# Patient Record
Sex: MALE | Race: WHITE | Employment: FULL TIME | URBAN - METROPOLITAN AREA
[De-identification: names, ages, dates, MRNs, and addresses within clinical notes are randomized per-mention and may not be internally consistent; named-entity substitution may affect disease eponyms.]

---

## 2018-02-26 ENCOUNTER — HOSPITAL ENCOUNTER (EMERGENCY)
Age: 63
Discharge: HOME | End: 2018-02-26
Attending: EMERGENCY MEDICINE
Payer: COMMERCIAL

## 2018-02-26 VITALS
HEIGHT: 74 IN | SYSTOLIC BLOOD PRESSURE: 126 MMHG | BODY MASS INDEX: 28.23 KG/M2 | HEART RATE: 89 BPM | WEIGHT: 220 LBS | DIASTOLIC BLOOD PRESSURE: 76 MMHG | TEMPERATURE: 97.3 F | RESPIRATION RATE: 14 BRPM | OXYGEN SATURATION: 95 %

## 2018-02-26 DIAGNOSIS — M54.41 ACUTE RIGHT-SIDED LOW BACK PAIN WITH RIGHT-SIDED SCIATICA: Primary | ICD-10-CM

## 2018-02-26 PROCEDURE — 74011250637 HC RX REV CODE- 250/637: Performed by: EMERGENCY MEDICINE

## 2018-02-26 PROCEDURE — 99283 EMERGENCY DEPT VISIT LOW MDM: CPT

## 2018-02-26 PROCEDURE — 96372 THER/PROPH/DIAG INJ SC/IM: CPT

## 2018-02-26 PROCEDURE — 74011250636 HC RX REV CODE- 250/636: Performed by: EMERGENCY MEDICINE

## 2018-02-26 RX ORDER — DEXAMETHASONE SODIUM PHOSPHATE 100 MG/10ML
10 INJECTION INTRAMUSCULAR; INTRAVENOUS
Status: DISCONTINUED | OUTPATIENT
Start: 2018-02-26 | End: 2018-02-26

## 2018-02-26 RX ORDER — KETOROLAC TROMETHAMINE 30 MG/ML
60 INJECTION, SOLUTION INTRAMUSCULAR; INTRAVENOUS
Status: COMPLETED | OUTPATIENT
Start: 2018-02-26 | End: 2018-02-26

## 2018-02-26 RX ORDER — KETOROLAC TROMETHAMINE 30 MG/ML
INJECTION, SOLUTION INTRAMUSCULAR; INTRAVENOUS
Status: DISCONTINUED
Start: 2018-02-26 | End: 2018-02-27 | Stop reason: HOSPADM

## 2018-02-26 RX ORDER — DIAZEPAM 5 MG/1
5 TABLET ORAL
Qty: 10 TAB | Refills: 0 | Status: SHIPPED | OUTPATIENT
Start: 2018-02-26 | End: 2018-03-13

## 2018-02-26 RX ORDER — KETOROLAC TROMETHAMINE 30 MG/ML
30 INJECTION, SOLUTION INTRAMUSCULAR; INTRAVENOUS
Status: DISCONTINUED | OUTPATIENT
Start: 2018-02-26 | End: 2018-02-26

## 2018-02-26 RX ORDER — DIAZEPAM 5 MG/1
5 TABLET ORAL
Status: DISCONTINUED | OUTPATIENT
Start: 2018-02-26 | End: 2018-02-26

## 2018-02-26 RX ORDER — METHYLPREDNISOLONE 4 MG/1
TABLET ORAL
Qty: 1 DOSE PACK | Refills: 0 | Status: SHIPPED | OUTPATIENT
Start: 2018-02-26 | End: 2018-03-09

## 2018-02-26 RX ORDER — DIAZEPAM 10 MG/2ML
5 INJECTION INTRAMUSCULAR
Status: DISCONTINUED | OUTPATIENT
Start: 2018-02-26 | End: 2018-02-26

## 2018-02-26 RX ORDER — HYDROMORPHONE HYDROCHLORIDE 2 MG/ML
1 INJECTION, SOLUTION INTRAMUSCULAR; INTRAVENOUS; SUBCUTANEOUS
Status: COMPLETED | OUTPATIENT
Start: 2018-02-26 | End: 2018-02-26

## 2018-02-26 RX ORDER — DIAZEPAM 5 MG/1
5 TABLET ORAL
Status: COMPLETED | OUTPATIENT
Start: 2018-02-26 | End: 2018-02-26

## 2018-02-26 RX ADMIN — HYDROMORPHONE HYDROCHLORIDE 1 MG: 2 INJECTION INTRAMUSCULAR; INTRAVENOUS; SUBCUTANEOUS at 22:41

## 2018-02-26 RX ADMIN — KETOROLAC TROMETHAMINE 60 MG: 30 INJECTION, SOLUTION INTRAMUSCULAR at 21:35

## 2018-02-26 RX ADMIN — DIAZEPAM 5 MG: 5 TABLET ORAL at 21:35

## 2018-02-27 NOTE — ED PROVIDER NOTES
HPI Comments: 58 y.o. male with past medical history significant for hypercholesterolemia who presents from home via private vehicle with chief complaint of lower back pain. Pt reports that he had onset of lower back pain a little over a month ago and was seen at [facility] and referred to Dr. aJyesh Gupta, orthopedics. 3 weeks ago, pt received an injection and notes that he had started to have some improvement of the pain. He had the second injection 4 days ago, but notes onset 3 days ago of a different pain that has progressively worsened. Pt notes that this pain, while also located in his lower back, is accompanied by numbness that is \"creeping up\" his RLE, currently present in his foot and calf. He states that the back pain radiated down his right leg prior to the second injection, but afterwards, he claims to now have a \"electric shock\" pain that \"shoots up\" from his foot to his right hip every time he tries to bear weight on the RLE. Pt was on oral steroids 3 weeks ago. Pt was prescribed percocet 1/22/18 and has been trying to stretch it, recently running out. He has also been taking 300 mg Flexeril BID since the same date. Pt denies fever, CP, SOB, and having any incontinence of bowels or bladder. Pt has not yet had an MRI of his back. Pt and spouse tried to call but were unable to get in contact with pt's MD today. Pt denies fever, CP, and SOB. There are no other acute medical concerns at this time. Orthopedist: Mary Lou Parker MD     Note written by Cholo Toro, as dictated by Paz Durand MD 8:18 PM     The history is provided by the patient and the spouse. No  was used. No past medical history on file. No past surgical history on file. No family history on file. Social History     Social History    Marital status:      Spouse name: N/A    Number of children: N/A    Years of education: N/A     Occupational History    Not on file.      Social History Main Topics    Smoking status: Not on file    Smokeless tobacco: Not on file    Alcohol use Not on file    Drug use: Not on file    Sexual activity: Not on file     Other Topics Concern    Not on file     Social History Narrative         ALLERGIES: Review of patient's allergies indicates not on file. Review of Systems   Constitutional: Negative for fever. Respiratory: Negative for shortness of breath. Cardiovascular: Negative for chest pain. Genitourinary: Negative for difficulty urinating. Musculoskeletal: Positive for back pain. +RLE pain   Neurological: Positive for numbness (RLE). No bowel or bladder inconinence   All other systems reviewed and are negative.       Vitals:    02/26/18 1910   BP: (!) 147/105   Pulse: 90   Resp: 20   Temp: 97.3 °F (36.3 °C)   SpO2: 99%   Weight: 99.8 kg (220 lb)   Height: 6' 2\" (1.88 m)            Physical Exam   Physical Examination: General appearance - alert, mild distress, oriented to person, place, and time and normal appearing weight  Eyes - pupils equal and reactive, extraocular eye movements intact  Neck - supple, no significant adenopathy  Chest - clear to auscultation, no wheezes, rales or rhonchi, symmetric air entry  Heart - normal rate, regular rhythm, normal S1, S2, no murmurs, rubs, clicks or gallops  Abdomen - soft, nontender, nondistended, no masses or organomegaly  Back exam - limited rom due to pain, tenderness to right paraspinal muscle region in lumbar spine, normal b/l LE reflexes  Neurological - alert, oriented, normal speech, no focal findings or movement disorder noted  Musculoskeletal - no joint tenderness, deformity or swelling  Extremities - peripheral pulses normal, no pedal edema, no clubbing or cyanosis  Skin - normal coloration and turgor, no rashes, no suspicious skin lesions noted  MDM  Number of Diagnoses or Management Options  Acute right-sided low back pain with right-sided sciatica:      Amount and/or Complexity of Data Reviewed  Obtain history from someone other than the patient: yes (wife)    Patient Progress  Patient progress: improved        ED Course       Procedures    Pain improved with meds in ED. Has f/u appt with ortho spine Dr. Michael Grande on Wednesday. No bowel/bladder incontinence or saddle anesthesia. Return to ED for worsening symptoms.

## 2018-02-27 NOTE — ED TRIAGE NOTES
Patient with back pain that radiates down the right leg, patient of Dr Germán Feliz, was not able to get [place] Phenes of MD

## 2018-02-27 NOTE — DISCHARGE INSTRUCTIONS
Back Pain: Care Instructions  Your Care Instructions    Back pain has many possible causes. It is often related to problems with muscles and ligaments of the back. It may also be related to problems with the nerves, discs, or bones of the back. Moving, lifting, standing, sitting, or sleeping in an awkward way can strain the back. Sometimes you don't notice the injury until later. Arthritis is another common cause of back pain. Although it may hurt a lot, back pain usually improves on its own within several weeks. Most people recover in 12 weeks or less. Using good home treatment and being careful not to stress your back can help you feel better sooner. Follow-up care is a key part of your treatment and safety. Be sure to make and go to all appointments, and call your doctor if you are having problems. It's also a good idea to know your test results and keep a list of the medicines you take. How can you care for yourself at home? · Sit or lie in positions that are most comfortable and reduce your pain. Try one of these positions when you lie down:  ¨ Lie on your back with your knees bent and supported by large pillows. ¨ Lie on the floor with your legs on the seat of a sofa or chair. Edgar Harries on your side with your knees and hips bent and a pillow between your legs. ¨ Lie on your stomach if it does not make pain worse. · Do not sit up in bed, and avoid soft couches and twisted positions. Bed rest can help relieve pain at first, but it delays healing. Avoid bed rest after the first day of back pain. · Change positions every 30 minutes. If you must sit for long periods of time, take breaks from sitting. Get up and walk around, or lie in a comfortable position. · Try using a heating pad on a low or medium setting for 15 to 20 minutes every 2 or 3 hours. Try a warm shower in place of one session with the heating pad. · You can also try an ice pack for 10 to 15 minutes every 2 to 3 hours.  Put a thin cloth between the ice pack and your skin. · Take pain medicines exactly as directed. ¨ If the doctor gave you a prescription medicine for pain, take it as prescribed. ¨ If you are not taking a prescription pain medicine, ask your doctor if you can take an over-the-counter medicine. · Take short walks several times a day. You can start with 5 to 10 minutes, 3 or 4 times a day, and work up to longer walks. Walk on level surfaces and avoid hills and stairs until your back is better. · Return to work and other activities as soon as you can. Continued rest without activity is usually not good for your back. · To prevent future back pain, do exercises to stretch and strengthen your back and stomach. Learn how to use good posture, safe lifting techniques, and proper body mechanics. When should you call for help? Call your doctor now or seek immediate medical care if:  ? · You have new or worsening numbness in your legs. ? · You have new or worsening weakness in your legs. (This could make it hard to stand up.)   ? · You lose control of your bladder or bowels. ? Watch closely for changes in your health, and be sure to contact your doctor if:  ? · Your pain gets worse. ? · You are not getting better after 2 weeks. Where can you learn more? Go to http://amy-manuel.info/. Enter V594 in the search box to learn more about \"Back Pain: Care Instructions. \"  Current as of: March 21, 2017  Content Version: 11.4  © 3780-0447 [employer]. Care instructions adapted under license by [employer] (which disclaims liability or warranty for this information). If you have questions about a medical condition or this instruction, always ask your healthcare professional. [address] any warranty or liability for your use of this information. Back Stretches: Exercises  Your Care Instructions  Here are some examples of exercises for stretching your back.  Start each exercise slowly. Ease off the exercise if you start to have pain. Your doctor or physical therapist will tell you when you can start these exercises and which ones will work best for you. How to do the exercises  Overhead stretch    1. Stand comfortably with your feet shoulder-width apart. 2. Looking straight ahead, raise both arms over your head and reach toward the ceiling. Do not allow your head to tilt back. 3. Hold for 15 to 30 seconds, then lower your arms to your sides. 4. Repeat 2 to 4 times. Side stretch    1. Stand comfortably with your feet shoulder-width apart. 2. Raise one arm over your head, and then lean to the other side. 3. Slide your hand down your leg as you let the weight of your arm gently stretch your side muscles. Hold for 15 to 30 seconds. 4. Repeat 2 to 4 times on each side. Press-up    1. Lie on your stomach, supporting your body with your forearms. 2. Press your elbows down into the floor to raise your upper back. As you do this, relax your stomach muscles and allow your back to arch without using your back muscles. As your press up, do not let your hips or pelvis come off the floor. 3. Hold for 15 to 30 seconds, then relax. 4. Repeat 2 to 4 times. Relax and rest    1. Lie on your back with a rolled towel under your neck and a pillow under your knees. Extend your arms comfortably to your sides. 2. Relax and breathe normally. 3. Remain in this position for about 10 minutes. 4. If you can, do this 2 or 3 times each day. Follow-up care is a key part of your treatment and safety. Be sure to make and go to all appointments, and call your doctor if you are having problems. It's also a good idea to know your test results and keep a list of the medicines you take. Where can you learn more? Go to http://amy-manuel.info/. Enter X146 in the search box to learn more about \"Back Stretches: Exercises. \"  Current as of: March 21, 2017  Content Version: 11.4  © 8471-7378 [employer]. Care instructions adapted under license by [employer] (which disclaims liability or warranty for this information). If you have questions about a medical condition or this instruction, always ask your healthcare professional. [address] any warranty or liability for your use of this information. We hope that we have addressed all of your medical concerns. The examination and treatment you received in the Emergency Department were for an emergent problem and were not intended as complete care. It is important that you follow up with your healthcare provider(s) for ongoing care. If your symptoms worsen or do not improve as expected, and you are unable to reach your usual health care provider(s), you should return to the Emergency Department. Today's healthcare is undergoing tremendous change, and patient satisfaction surveys are one of the many tools to assess the quality of medical care. You may receive a survey from the [employer] regarding your experience in the Emergency Department. I hope that your experience has been completely positive, particularly the medical care that I provided. As such, please participate in the survey; anything less than excellent does not meet my expectations or intentions. [facility] and [facility] participate in nationally recognized quality of care measures. If your blood pressure is greater than 120/80, as reported below, we urge that you seek medical care to address the potential of high blood pressure, commonly known as hypertension. Hypertension can be hereditary or can be caused by certain medical conditions, pain, stress, or \"white coat syndrome. \"       Please make an appointment with your health care provider(s) for follow up of your Emergency Department visit.        VITALS:   Patient Vitals for the past 8 hrs:   Temp Pulse Resp BP SpO2   02/26/18 1910 97.3 °F (36.3 °C) 90 20 (!) 147/105 99 %          Thank you for allowing us to provide you with medical care today. We realize that you have many choices for your emergency care needs. Please choose us in the future for any continued health care needs. Esteban Garcia, [address]: [phone]            No results found for this or any previous visit (from the past 24 hour(s)). No results found.

## 2018-02-28 ENCOUNTER — HOSPITAL ENCOUNTER (OUTPATIENT)
Dept: MRI IMAGING | Age: 63
Discharge: HOME | End: 2018-02-28
Attending: ORTHOPAEDIC SURGERY
Payer: COMMERCIAL

## 2018-02-28 DIAGNOSIS — M54.16 LUMBAR RADICULOPATHY: ICD-10-CM

## 2018-02-28 DIAGNOSIS — M43.16 SPONDYLOLISTHESIS OF LUMBAR REGION: ICD-10-CM

## 2018-02-28 PROCEDURE — 72148 MRI LUMBAR SPINE W/O DYE: CPT

## 2018-03-09 RX ORDER — ALLOPURINOL 100 MG/1
200 TABLET ORAL
COMMUNITY

## 2018-03-09 RX ORDER — GABAPENTIN 300 MG/1
300 CAPSULE ORAL 2 TIMES DAILY
COMMUNITY

## 2018-03-09 RX ORDER — OXYCODONE AND ACETAMINOPHEN 5; 325 MG/1; MG/1
1 TABLET ORAL
COMMUNITY
End: 2018-03-13

## 2018-03-09 RX ORDER — ATORVASTATIN CALCIUM 20 MG/1
20 TABLET, FILM COATED ORAL
COMMUNITY

## 2018-03-09 NOTE — PERIOP NOTES
Called patient prior to PAT appointment- reviewed medical/surgical/family/ social history. Also reviewed medications and health care providers. Patient asked to bring list of medications or actual medication containers to PAT appointment.

## 2018-03-13 ENCOUNTER — TELEPHONE (OUTPATIENT)
Dept: CARDIOLOGY CLINIC | Age: 63
End: 2018-03-13

## 2018-03-13 ENCOUNTER — HOSPITAL ENCOUNTER (OUTPATIENT)
Dept: PREADMISSION TESTING | Age: 63
Discharge: HOME | End: 2018-03-13
Payer: COMMERCIAL

## 2018-03-13 VITALS
SYSTOLIC BLOOD PRESSURE: 133 MMHG | WEIGHT: 213.85 LBS | OXYGEN SATURATION: 97 % | TEMPERATURE: 97 F | HEART RATE: 116 BPM | HEIGHT: 73 IN | RESPIRATION RATE: 16 BRPM | DIASTOLIC BLOOD PRESSURE: 91 MMHG | BODY MASS INDEX: 28.34 KG/M2

## 2018-03-13 LAB
ABO + RH BLD: NORMAL
ALBUMIN SERPL-MCNC: 4 G/DL (ref 3.5–5)
ALBUMIN/GLOB SERPL: 1.2 {RATIO} (ref 1.1–2.2)
ALP SERPL-CCNC: 82 U/L (ref 45–117)
ALT SERPL-CCNC: 50 U/L (ref 12–78)
ANION GAP SERPL CALC-SCNC: 9 MMOL/L (ref 5–15)
APPEARANCE UR: CLEAR
APTT PPP: 23.5 SEC (ref 22.1–32)
AST SERPL-CCNC: 20 U/L (ref 15–37)
ATRIAL RATE: 94 BPM
BACTERIA URNS QL MICRO: NEGATIVE /HPF
BASOPHILS # BLD: 0.1 K/UL (ref 0–0.1)
BASOPHILS NFR BLD: 1 % (ref 0–1)
BILIRUB SERPL-MCNC: 0.7 MG/DL (ref 0.2–1)
BILIRUB UR QL: NEGATIVE
BLOOD GROUP ANTIBODIES SERPL: NORMAL
BUN SERPL-MCNC: 19 MG/DL (ref 6–20)
BUN/CREAT SERPL: 17 (ref 12–20)
CALCIUM SERPL-MCNC: 11.3 MG/DL (ref 8.5–10.1)
CALCULATED P AXIS, ECG09: 57 DEGREES
CALCULATED R AXIS, ECG10: 47 DEGREES
CALCULATED T AXIS, ECG11: 34 DEGREES
CAOX CRY URNS QL MICRO: ABNORMAL
CHLORIDE SERPL-SCNC: 101 MMOL/L (ref 97–108)
CO2 SERPL-SCNC: 31 MMOL/L (ref 21–32)
COLOR UR: ABNORMAL
CREAT SERPL-MCNC: 1.09 MG/DL (ref 0.7–1.3)
DIAGNOSIS, 93000: NORMAL
DIFFERENTIAL METHOD BLD: ABNORMAL
EOSINOPHIL # BLD: 0.1 K/UL (ref 0–0.4)
EOSINOPHIL NFR BLD: 0 % (ref 0–7)
EPITH CASTS URNS QL MICRO: ABNORMAL /LPF
ERYTHROCYTE [DISTWIDTH] IN BLOOD BY AUTOMATED COUNT: 13.3 % (ref 11.5–14.5)
EST. AVERAGE GLUCOSE BLD GHB EST-MCNC: 160 MG/DL
GLOBULIN SER CALC-MCNC: 3.4 G/DL (ref 2–4)
GLUCOSE SERPL-MCNC: 175 MG/DL (ref 65–100)
GLUCOSE UR STRIP.AUTO-MCNC: NEGATIVE MG/DL
HBA1C MFR BLD: 7.2 % (ref 4.2–6.3)
HCT VFR BLD AUTO: 50.5 % (ref 36.6–50.3)
HGB BLD-MCNC: 16.8 G/DL (ref 12.1–17)
HGB UR QL STRIP: NEGATIVE
HYALINE CASTS URNS QL MICRO: ABNORMAL /LPF (ref 0–5)
IMM GRANULOCYTES # BLD: 0.1 K/UL (ref 0–0.04)
IMM GRANULOCYTES NFR BLD AUTO: 1 % (ref 0–0.5)
INR PPP: 1 (ref 0.9–1.1)
KETONES UR QL STRIP.AUTO: NEGATIVE MG/DL
LEUKOCYTE ESTERASE UR QL STRIP.AUTO: NEGATIVE
LYMPHOCYTES # BLD: 1.5 K/UL (ref 0.8–3.5)
LYMPHOCYTES NFR BLD: 12 % (ref 12–49)
MCH RBC QN AUTO: 28.1 PG (ref 26–34)
MCHC RBC AUTO-ENTMCNC: 33.3 G/DL (ref 30–36.5)
MCV RBC AUTO: 84.6 FL (ref 80–99)
MONOCYTES # BLD: 0.8 K/UL (ref 0–1)
MONOCYTES NFR BLD: 6 % (ref 5–13)
NEUTS SEG # BLD: 10.8 K/UL (ref 1.8–8)
NEUTS SEG NFR BLD: 81 % (ref 32–75)
NITRITE UR QL STRIP.AUTO: NEGATIVE
NRBC # BLD: 0 K/UL (ref 0–0.01)
NRBC BLD-RTO: 0 PER 100 WBC
P-R INTERVAL, ECG05: 190 MS
PH UR STRIP: 5.5 [PH] (ref 5–8)
PLATELET # BLD AUTO: 255 K/UL (ref 150–400)
PMV BLD AUTO: 9.4 FL (ref 8.9–12.9)
POTASSIUM SERPL-SCNC: 4.2 MMOL/L (ref 3.5–5.1)
PROT SERPL-MCNC: 7.4 G/DL (ref 6.4–8.2)
PROT UR STRIP-MCNC: NEGATIVE MG/DL
PROTHROMBIN TIME: 10.4 SEC (ref 9–11.1)
Q-T INTERVAL, ECG07: 298 MS
QRS DURATION, ECG06: 80 MS
QTC CALCULATION (BEZET), ECG08: 372 MS
RBC # BLD AUTO: 5.97 M/UL (ref 4.1–5.7)
RBC #/AREA URNS HPF: ABNORMAL /HPF (ref 0–5)
SODIUM SERPL-SCNC: 141 MMOL/L (ref 136–145)
SP GR UR REFRACTOMETRY: 1.03 (ref 1–1.03)
SPECIMEN EXP DATE BLD: NORMAL
THERAPEUTIC RANGE,PTTT: NORMAL SECS (ref 58–77)
UA: UC IF INDICATED,UAUC: ABNORMAL
UROBILINOGEN UR QL STRIP.AUTO: 1 EU/DL (ref 0.2–1)
VENTRICULAR RATE, ECG03: 94 BPM
WBC # BLD AUTO: 13.4 K/UL (ref 4.1–11.1)
WBC URNS QL MICRO: ABNORMAL /HPF (ref 0–4)

## 2018-03-13 PROCEDURE — 80053 COMPREHEN METABOLIC PANEL: CPT | Performed by: ORTHOPAEDIC SURGERY

## 2018-03-13 PROCEDURE — 86900 BLOOD TYPING SEROLOGIC ABO: CPT | Performed by: ORTHOPAEDIC SURGERY

## 2018-03-13 PROCEDURE — 83036 HEMOGLOBIN GLYCOSYLATED A1C: CPT | Performed by: ORTHOPAEDIC SURGERY

## 2018-03-13 PROCEDURE — 85610 PROTHROMBIN TIME: CPT | Performed by: ORTHOPAEDIC SURGERY

## 2018-03-13 PROCEDURE — 85025 COMPLETE CBC W/AUTO DIFF WBC: CPT | Performed by: ORTHOPAEDIC SURGERY

## 2018-03-13 PROCEDURE — 81001 URINALYSIS AUTO W/SCOPE: CPT | Performed by: ORTHOPAEDIC SURGERY

## 2018-03-13 PROCEDURE — 85730 THROMBOPLASTIN TIME PARTIAL: CPT | Performed by: ORTHOPAEDIC SURGERY

## 2018-03-13 PROCEDURE — 36415 COLL VENOUS BLD VENIPUNCTURE: CPT | Performed by: ORTHOPAEDIC SURGERY

## 2018-03-13 PROCEDURE — 93005 ELECTROCARDIOGRAM TRACING: CPT

## 2018-03-13 RX ORDER — HYDROCODONE BITARTRATE AND ACETAMINOPHEN 5; 325 MG/1; MG/1
1 TABLET ORAL
Status: ON HOLD | COMMUNITY
End: 2018-03-20

## 2018-03-13 NOTE — PERIOP NOTES
[facility], [address]    MAIN OR [phone]    MAIN PRE OP [phone]    AMBULATORY PRE OP [phone]    PRE-ADMISSION TESTING [phone]       Surgery Date:   Tuesday 3/20/18        Is surgery arrival time given by surgeon? NO  If NO, [facility] staff will call you between 3 and 7pm the day before your surgery with your arrival time. (If your surgery is on a Monday, we will call you the Friday before.)    Call [phone] after 7pm Monday-Friday if you did not receive your arrival time.     Answers to Common Questions   When You  Arrive   Arrive at the [facility] [address] [facility] on the day of your surgery  Have your insurance card, photo ID, and any copayment (if needed)     Food   and   Drink   NO food or drink after midnight the night before surgery    This means NO water, gum, mints, coffee, juice, etc.  No alcohol (beer, wine, liquor) 24 hours before and after surgery     Medicine to   TAKE   Morning of Surgery   MEDICATIONS TO TAKE THE MORNING OF SURGERY WITH A SIP OF WATER:    Gabapentin, norco     Medicine  To  STOP   FOR PAIN   NO Aspirin for pain    NO Non-Steroidal Anti-Inflammatory Drugs (NSAIDs:   for example, Ibuprofen (Advil, Motrin), Naproxen (Aleve)   STOP herbal supplements and vitamins 1 week before surgery   You can take Tylenol  follow instructions on the bottle     Blood  Thinners    If you take Aspirin, Plavix, Coumadin, blood-thinning or anti-clot medicine, talk to your surgeon and/or follow the instructions from the doctor who told you to take that medicine     Clothing  Jewelry  Valuables  Bathing     CLOTHING   Wear loose, comfortable clothes   Wear glasses instead of contacts   Leave money, jewelry and valuables at home   No make-up, particularly mascara, the day of surgery   REMOVE ALL piercings, rings, and jewelry - leave at home   Wear hair loose or down; no pony-tails, buns, or metal hair clips    BATHING   Follow all special bath instructions (for total joint replacement, spine and bowel surgeries.)   If you shower the morning of surgery, please do not apply any lotions, powders, or deodorants afterwards. Do not shave or trim anywhere 24 hours before surgery. Going Home  or  Spending the Night    SAME-DAY SURGERY: You must have a responsible adult drive you home and stay with you 24 hours after surgery   ADMITS: If your doctor is keeping you into the hospital after surgery, leave personal belongings/luggage in your car until you have a hospital room number. Hospital discharge time is 12 noon  Drivers must be here before 12 noon unless you are told differently         Follow all instructions so your surgery wont be cancelled. Please, be on time. If a situation occurs and you are delayed the day of surgery, call [phone] or [phone]. If your physical condition changes (like a fever, cold, flu, etc.) call your surgeon as soon as possible. The [facility] staff can be reached at [phone]. OTHER SPECIAL INSTRUCTIONS:  16.  Special Instructions:  · Use Chlorhexidine Care Fusion wash and sponges 3 days prior to surgery as instructed. · Incentive spirometer given with instructions to practice at home and bring back to the hospital on the day of surgery. · [facility] will contact you if your Hemoglobin A1C is greater than 7.5. · Ensure/Glucerna  sample, nutritional information, and Ensure/Glucerna coupon given. · Pain pamphlet and Call Don't Fall reminder reviewed with patient. ·  parking is complimentary Monday - Friday 7 am - 5 pm  · Bring PTA Medication list day of surgery with the last doses taken documented   Do not bring medication bottles the day of surgery    The patient was contacted  in person. He  verbalize  understanding of all instructions and does not  need reinforcement.

## 2018-03-13 NOTE — PROGRESS NOTES
Mr Astrid Michaud EKG reading acute/stemi first four EKG , the last EKG reading was NSR I called for a stat reading just to make sure before I let the pt go home. Pt stated no chest pain or shortness of breath just a lot of back pain .

## 2018-03-13 NOTE — H&P
PAT Pre-Op History & Physical    Patient: Rachid Navarro                  MRN: [id number]          SSN: [id number]  YOB: 1955          Age: 58 y.o. Sex: male                Subjective:     Patient is a 58 y.o.  male who presents with history of January 24, 2018 back pain started. It started with pain in the right hip all the way down to toes. Pain started getting worse. After the last injection he was unable to stand and walk. He has numbness and tingling that happen in both feet but worse in the right, His right leg will buckle at times, pain ranges from 4/10-10/10. Movement makes the pain worse. Sleep has been affected. He has to lay very still and quietly to keep the pain away. Uses cane at home to ambulate. He has failed steroid pack, muscle relaxant, injections, narcotics and Biofreeze. The patient was evaluated in the surgeon's office and it was determined that the most appropriate plan of care is to proceed with surgical intervention. Patient's PCP Park Jama MD      Past Medical History:   Diagnosis Date    Gout     Hyperlipidemia       Past Surgical History:   Procedure Laterality Date    HX COLONOSCOPY N/A     HX KNEE ARTHROSCOPY Bilateral 2008,2011    torn meniscus    HX ORTHOPAEDIC Right 2011    remove bone spur on elbow      Prior to Admission medications    Medication Sig Start Date End Date Taking? Authorizing Provider   HYDROcodone-acetaminophen (NORCO) 5-325 mg per tablet Take 1 Tab by mouth every four (4) hours as needed for Pain. Yes Historical Provider   atorvastatin (LIPITOR) 20 mg tablet Take 20 mg by mouth nightly. Yes Historical Provider   allopurinol (ZYLOPRIM) 100 mg tablet Take 200 mg by mouth nightly. Yes Historical Provider   gabapentin (NEURONTIN) 300 mg capsule Take 300 mg by mouth two (2) times a day.    Yes Historical Provider        No Known Allergies     Social History   Substance Use Topics    Smoking status: Never Smoker    Smokeless tobacco: Never Used    Alcohol use 1.2 oz/week     2 Cans of beer per week      Comment: does not drink every week      History   Drug Use No     Family History   Problem Relation Age of Onset    Cancer Mother      lung    Heart Failure Father     Heart Attack Father      x2    Hypertension Father     Diabetes Sister     Other Sister      diverticulitis         Review of Systems    Patient denies difficulty swallowing, mouth sores, or loose teeth. Patient denies any recent dental procedures or any planned prior to surgery. Patient denies chest pain, tightness, pain radiating down left arm, palpitations. Denies dizziness, visual disturbances, or lightheadedness. Patient denies shortness of breath, wheezing, cough, fever, or chills. Patient denies diarrhea or abdominal pain. Patient states he has constipation occasionally with the narcotics. Patient denies urinary problems including dysuria, hesitancy, urgency, or incontinence. Denies skin breakdown, rashes, insect bites or open area. Objective:     Patient Vitals for the past 24 hrs:   Temp Pulse Resp BP SpO2   18 1433 - (!) 116 - (!) 133/91 -   18 1411 97 °F (36.1 °C) (!) 128 16 (!) 163/93 97 %     Temp (24hrs), Av °F (36.1 °C), Min:97 °F (36.1 °C), Max:97 °F (36.1 °C)    Body mass index is 28.21 kg/(m^2). Wt Readings from Last 1 Encounters:   18 97 kg (213 lb 13.5 oz)        Physical Exam:     General: Pleasant,  cooperative, no apparent distress, appears stated age. Eyes: Conjunctivae/corneas clear. EOMs intact. Nose: Nares normal.   Mouth/Throat: Lips, mucosa, and tongue normal. Teeth and gums normal.   Lungs: Clear to auscultation bilaterally. Heart: Tachycardic, S1, S2 normal. No murmur, click, rub or gallop. Abdomen: Soft, non-tender. Bowel sounds normal. No distention. Musculoskeletal:  Currently in wheelchair, back brace present, non-ambulating.     Extremities:  Extremities normal, atraumatic, no cyanosis or edema. Calves                                 supple, non tender to palpation. Pulses: 2+ and symmetric bilateral upper extremities. Cap. refill <2 seconds   Skin: Skin color, texture, turgor normal. No visible open areas, examined fully clothed   Neurologic: CN II-XII grossly intact. Alert and oriented x3. Labs:   Recent Results (from the past 72 hour(s))   TYPE & SCREEN    Collection Time: 03/13/18  2:50 PM   Result Value Ref Range    Crossmatch Expiration 03/23/2018     ABO/Rh(D) A POSITIVE     Antibody screen NEG    CBC WITH AUTOMATED DIFF    Collection Time: 03/13/18  2:50 PM   Result Value Ref Range    WBC 13.4 (H) 4.1 - 11.1 K/uL    RBC 5.97 (H) 4.10 - 5.70 M/uL    HGB 16.8 12.1 - 17.0 g/dL    HCT 50.5 (H) 36.6 - 50.3 %    MCV 84.6 80.0 - 99.0 FL    MCH 28.1 26.0 - 34.0 PG    MCHC 33.3 30.0 - 36.5 g/dL    RDW 13.3 11.5 - 14.5 %    PLATELET 108 729 - 161 K/uL    MPV 9.4 8.9 - 12.9 FL    NRBC 0.0 0  WBC    ABSOLUTE NRBC 0.00 0.00 - 0.01 K/uL    NEUTROPHILS 81 (H) 32 - 75 %    LYMPHOCYTES 12 12 - 49 %    MONOCYTES 6 5 - 13 %    EOSINOPHILS 0 0 - 7 %    BASOPHILS 1 0 - 1 %    IMMATURE GRANULOCYTES 1 (H) 0.0 - 0.5 %    ABS. NEUTROPHILS 10.8 (H) 1.8 - 8.0 K/UL    ABS. LYMPHOCYTES 1.5 0.8 - 3.5 K/UL    ABS. MONOCYTES 0.8 0.0 - 1.0 K/UL    ABS. EOSINOPHILS 0.1 0.0 - 0.4 K/UL    ABS. BASOPHILS 0.1 0.0 - 0.1 K/UL    ABS. IMM.  GRANS. 0.1 (H) 0.00 - 0.04 K/UL    DF AUTOMATED     METABOLIC PANEL, COMPREHENSIVE    Collection Time: 03/13/18  2:50 PM   Result Value Ref Range    Sodium 141 136 - 145 mmol/L    Potassium 4.2 3.5 - 5.1 mmol/L    Chloride 101 97 - 108 mmol/L    CO2 31 21 - 32 mmol/L    Anion gap 9 5 - 15 mmol/L    Glucose 175 (H) 65 - 100 mg/dL    BUN 19 6 - 20 MG/DL    Creatinine 1.09 0.70 - 1.30 MG/DL    BUN/Creatinine ratio 17 12 - 20      GFR est AA >60 >60 ml/min/1.73m2    GFR est non-AA >60 >60 ml/min/1.73m2    Calcium 11.3 (H) 8.5 - 10.1 MG/DL    Bilirubin, total 0.7 0.2 - 1.0 MG/DL    ALT (SGPT) 50 12 - 78 U/L    AST (SGOT) 20 15 - 37 U/L    Alk.  phosphatase 82 45 - 117 U/L    Protein, total 7.4 6.4 - 8.2 g/dL    Albumin 4.0 3.5 - 5.0 g/dL    Globulin 3.4 2.0 - 4.0 g/dL    A-G Ratio 1.2 1.1 - 2.2     HEMOGLOBIN A1C WITH EAG    Collection Time: 03/13/18  2:50 PM   Result Value Ref Range    Hemoglobin A1c 7.2 (H) 4.2 - 6.3 %    Est. average glucose 160 mg/dL   PROTHROMBIN TIME + INR    Collection Time: 03/13/18  2:50 PM   Result Value Ref Range    INR 1.0 0.9 - 1.1      Prothrombin time 10.4 9.0 - 11.1 sec   PTT    Collection Time: 03/13/18  2:50 PM   Result Value Ref Range    aPTT 23.5 22.1 - 32.0 sec    aPTT, therapeutic range     58.0 - 77.0 SECS   URINALYSIS W/ REFLEX CULTURE    Collection Time: 03/13/18  2:50 PM   Result Value Ref Range    Color DARK YELLOW      Appearance CLEAR CLEAR      Specific gravity 1.026 1.003 - 1.030      pH (UA) 5.5 5.0 - 8.0      Protein NEGATIVE  NEG mg/dL    Glucose NEGATIVE  NEG mg/dL    Ketone NEGATIVE  NEG mg/dL    Bilirubin NEGATIVE  NEG      Blood NEGATIVE  NEG      Urobilinogen 1.0 0.2 - 1.0 EU/dL    Nitrites NEGATIVE  NEG      Leukocyte Esterase NEGATIVE  NEG      WBC 0-4 0 - 4 /hpf    RBC 0-5 0 - 5 /hpf    Epithelial cells FEW FEW /lpf    Bacteria NEGATIVE  NEG /hpf    UA:UC IF INDICATED CULTURE NOT INDICATED BY UA RESULT CNI      CA Oxalate crystals FEW (A) NEG      Hyaline cast 5-10 0 - 5 /lpf   EKG, 12 LEAD, INITIAL    Collection Time: 03/13/18  3:23 PM   Result Value Ref Range    Ventricular Rate 94 BPM    Atrial Rate 94 BPM    P-R Interval 190 ms    QRS Duration 80 ms    Q-T Interval 298 ms    QTC Calculation (Bezet) 372 ms    Calculated P Axis 57 degrees    Calculated R Axis 47 degrees    Calculated T Axis 34 degrees    Diagnosis       Normal sinus rhythm  Nonspecific ST segment changes  Abnormal ECG    Confirmed by Gordon Wilson MD., Marichuy Nelson ([id number]) on 3/13/2018 4:05:28 PM         Assessment:     Lumbar adjacent segment disease with spondylolisthesis [M51.36, M43.16]  Lumbar stenosis with neurogenic claudication [M48.062]    Plan:     Scheduled for L4-S1 LAMINECTOMY, FUSION, INSTRUMENTATION, TLIF, BONE GRAFT    Labs reviewed. Of note A1C returned at 7.2 with no previous diagnosis of DM. Sent to his PCP along with surgeon. CBC showing elevated WBC. Sent to surgeon. EKG showing non-specific ST changes. Per anesthesia algorithm no further workup needed. All other labs unremaarkable. MRSA pending.      Gume Check, NP

## 2018-03-13 NOTE — TELEPHONE ENCOUNTER
Mr. Eunice Patino EKG today showed non-specific ST changes. He has back pain for which he has plans for back surgery, but denies any cardiac complaints. No CP or SOB at any point. He is laying on his back comfortably when I see him and his heart exam is normal (No murmur, rub, gallop). I asked him to come to ER with any cardiac complaints.

## 2018-03-14 LAB
BACTERIA SPEC CULT: NORMAL
BACTERIA SPEC CULT: NORMAL
SERVICE CMNT-IMP: NORMAL

## 2018-03-15 NOTE — PERIOP NOTES
Orlin Francisco from [address] [place] [address] requested lab results and EKG be refaxed as they were too dark to read. Faxed to [phone]. Orlin Francisco called to clarify results. At this timeInformed me that she was patients wife.

## 2018-03-19 ENCOUNTER — ANESTHESIA EVENT (OUTPATIENT)
Dept: SURGERY | Age: 63
DRG: 453 | End: 2018-03-19
Payer: COMMERCIAL

## 2018-03-20 ENCOUNTER — ANESTHESIA (OUTPATIENT)
Dept: SURGERY | Age: 63
DRG: 453 | End: 2018-03-20
Payer: COMMERCIAL

## 2018-03-20 ENCOUNTER — APPOINTMENT (OUTPATIENT)
Dept: GENERAL RADIOLOGY | Age: 63
DRG: 453 | End: 2018-03-20
Attending: ORTHOPAEDIC SURGERY
Payer: COMMERCIAL

## 2018-03-20 ENCOUNTER — HOSPITAL ENCOUNTER (INPATIENT)
Age: 63
LOS: 5 days | Discharge: HOME HEALTH | DRG: 453 | End: 2018-03-25
Attending: ORTHOPAEDIC SURGERY | Admitting: ORTHOPAEDIC SURGERY
Payer: COMMERCIAL

## 2018-03-20 DIAGNOSIS — M48.061 SPINAL STENOSIS OF LUMBAR REGION, UNSPECIFIED WHETHER NEUROGENIC CLAUDICATION PRESENT: Primary | ICD-10-CM

## 2018-03-20 PROBLEM — M48.062 LUMBAR STENOSIS WITH NEUROGENIC CLAUDICATION: Status: ACTIVE | Noted: 2018-03-20

## 2018-03-20 LAB
GLUCOSE BLD STRIP.AUTO-MCNC: 159 MG/DL (ref 65–100)
SERVICE CMNT-IMP: ABNORMAL

## 2018-03-20 PROCEDURE — 77030034479 HC ADH SKN CLSR PRINEO J&J -B: Performed by: ORTHOPAEDIC SURGERY

## 2018-03-20 PROCEDURE — 74011250637 HC RX REV CODE- 250/637: Performed by: ORTHOPAEDIC SURGERY

## 2018-03-20 PROCEDURE — 74011250636 HC RX REV CODE- 250/636: Performed by: ANESTHESIOLOGY

## 2018-03-20 PROCEDURE — C1713 ANCHOR/SCREW BN/BN,TIS/BN: HCPCS | Performed by: ORTHOPAEDIC SURGERY

## 2018-03-20 PROCEDURE — 77030008684 HC TU ET CUF COVD -B: Performed by: NURSE ANESTHETIST, CERTIFIED REGISTERED

## 2018-03-20 PROCEDURE — 77030003161 HC GRFT DURA MTRX INLC -E: Performed by: ORTHOPAEDIC SURGERY

## 2018-03-20 PROCEDURE — 77030013079 HC BLNKT BAIR HGGR 3M -A: Performed by: NURSE ANESTHETIST, CERTIFIED REGISTERED

## 2018-03-20 PROCEDURE — 77030002933 HC SUT MCRYL J&J -A: Performed by: ORTHOPAEDIC SURGERY

## 2018-03-20 PROCEDURE — 77030018723 HC ELCTRD BLD COVD -A: Performed by: ORTHOPAEDIC SURGERY

## 2018-03-20 PROCEDURE — 77030032490 HC SLV COMPR SCD KNE COVD -B: Performed by: ORTHOPAEDIC SURGERY

## 2018-03-20 PROCEDURE — 77030026188 HC BN CANC CHP CRSH PR LIFV -E: Performed by: ORTHOPAEDIC SURGERY

## 2018-03-20 PROCEDURE — 77030003666 HC NDL SPINAL BD -A: Performed by: ORTHOPAEDIC SURGERY

## 2018-03-20 PROCEDURE — 76210000017 HC OR PH I REC 1.5 TO 2 HR: Performed by: ORTHOPAEDIC SURGERY

## 2018-03-20 PROCEDURE — 01NB0ZZ RELEASE LUMBAR NERVE, OPEN APPROACH: ICD-10-PCS | Performed by: ORTHOPAEDIC SURGERY

## 2018-03-20 PROCEDURE — 74011000250 HC RX REV CODE- 250

## 2018-03-20 PROCEDURE — 65270000029 HC RM PRIVATE

## 2018-03-20 PROCEDURE — 77030012406 HC DRN WND PENRS BARD -A: Performed by: ORTHOPAEDIC SURGERY

## 2018-03-20 PROCEDURE — 77030033067 HC SUT PDO STRATFX SPIR J&J -B: Performed by: ORTHOPAEDIC SURGERY

## 2018-03-20 PROCEDURE — 77030018719 HC DRSG PTCH ANTIMIC J&J -A: Performed by: ORTHOPAEDIC SURGERY

## 2018-03-20 PROCEDURE — 77030037202 HC SPCR SPN BULL TIP PEK VBR IBF REGE -I1: Performed by: ORTHOPAEDIC SURGERY

## 2018-03-20 PROCEDURE — 77030034274 HC GRFT BN CHIP ALLGRFT 10CC REGE -I: Performed by: ORTHOPAEDIC SURGERY

## 2018-03-20 PROCEDURE — 77030030107 HC BLD BN MILL DISP STRY -C: Performed by: ORTHOPAEDIC SURGERY

## 2018-03-20 PROCEDURE — 77030008771 HC TU NG SALEM SUMP -A: Performed by: NURSE ANESTHETIST, CERTIFIED REGISTERED

## 2018-03-20 PROCEDURE — 82962 GLUCOSE BLOOD TEST: CPT

## 2018-03-20 PROCEDURE — 77030029099 HC BN WAX SSPC -A: Performed by: ORTHOPAEDIC SURGERY

## 2018-03-20 PROCEDURE — 76001 XR FLUOROSCOPY OVER 60 MINUTES: CPT

## 2018-03-20 PROCEDURE — 76060000039 HC ANESTHESIA 4 TO 4.5 HR: Performed by: ORTHOPAEDIC SURGERY

## 2018-03-20 PROCEDURE — 77030034850: Performed by: ORTHOPAEDIC SURGERY

## 2018-03-20 PROCEDURE — 0SG3071 FUSION OF LUMBOSACRAL JOINT WITH AUTOLOGOUS TISSUE SUBSTITUTE, POSTERIOR APPROACH, POSTERIOR COLUMN, OPEN APPROACH: ICD-10-PCS | Performed by: ORTHOPAEDIC SURGERY

## 2018-03-20 PROCEDURE — 0SB40ZZ EXCISION OF LUMBOSACRAL DISC, OPEN APPROACH: ICD-10-PCS | Performed by: ORTHOPAEDIC SURGERY

## 2018-03-20 PROCEDURE — 77030019908 HC STETH ESOPH SIMS -A: Performed by: NURSE ANESTHETIST, CERTIFIED REGISTERED

## 2018-03-20 PROCEDURE — 77030004391 HC BUR FLUT MEDT -C: Performed by: ORTHOPAEDIC SURGERY

## 2018-03-20 PROCEDURE — 77030018846 HC SOL IRR STRL H20 ICUM -A: Performed by: ORTHOPAEDIC SURGERY

## 2018-03-20 PROCEDURE — 76010000175 HC OR TIME 4 TO 4.5 HR INTENSV-TIER 1: Performed by: ORTHOPAEDIC SURGERY

## 2018-03-20 PROCEDURE — 0SB20ZZ EXCISION OF LUMBAR VERTEBRAL DISC, OPEN APPROACH: ICD-10-PCS | Performed by: ORTHOPAEDIC SURGERY

## 2018-03-20 PROCEDURE — 0SG00AJ FUSION OF LUMBAR VERTEBRAL JOINT WITH INTERBODY FUSION DEVICE, POSTERIOR APPROACH, ANTERIOR COLUMN, OPEN APPROACH: ICD-10-PCS | Performed by: ORTHOPAEDIC SURGERY

## 2018-03-20 PROCEDURE — 77030031139 HC SUT VCRL2 J&J -A: Performed by: ORTHOPAEDIC SURGERY

## 2018-03-20 PROCEDURE — 01NR0ZZ RELEASE SACRAL NERVE, OPEN APPROACH: ICD-10-PCS | Performed by: ORTHOPAEDIC SURGERY

## 2018-03-20 PROCEDURE — 74011000250 HC RX REV CODE- 250: Performed by: ORTHOPAEDIC SURGERY

## 2018-03-20 PROCEDURE — 74011250636 HC RX REV CODE- 250/636

## 2018-03-20 PROCEDURE — 74011250636 HC RX REV CODE- 250/636: Performed by: ORTHOPAEDIC SURGERY

## 2018-03-20 PROCEDURE — 77030020061 HC IV BLD WRMR ADMIN SET 3M -B: Performed by: NURSE ANESTHETIST, CERTIFIED REGISTERED

## 2018-03-20 PROCEDURE — 74011000272 HC RX REV CODE- 272: Performed by: ORTHOPAEDIC SURGERY

## 2018-03-20 DEVICE — DURAGEN® SUTURABLE DURAL REGENERATION MATRIX, 2 IN X 2 IN (5 CM X 5 CM)
Type: IMPLANTABLE DEVICE | Site: SPINE LUMBAR | Status: FUNCTIONAL
Brand: DURAGEN® SUTURABLE

## 2018-03-20 DEVICE — IMPLANTABLE DEVICE: Type: IMPLANTABLE DEVICE | Site: SPINE LUMBAR | Status: FUNCTIONAL

## 2018-03-20 DEVICE — SCR SET SPNE STREAMLINE TL --: Type: IMPLANTABLE DEVICE | Site: SPINE LUMBAR | Status: FUNCTIONAL

## 2018-03-20 DEVICE — SCR BNE SPNE 6.5X50MM TI -- STREAMLINE TL: Type: IMPLANTABLE DEVICE | Site: SPINE LUMBAR | Status: FUNCTIONAL

## 2018-03-20 DEVICE — BONE CHIP CANC CRSH 1-8MM 30ML --: Type: IMPLANTABLE DEVICE | Site: SPINE LUMBAR | Status: FUNCTIONAL

## 2018-03-20 DEVICE — 5.5MM CURVED ROD 70MM TI ALLOY
Type: IMPLANTABLE DEVICE | Site: SPINE LUMBAR | Status: FUNCTIONAL
Brand: TAURUS

## 2018-03-20 DEVICE — GRAFT BNE SUB 10CC CORT CANC DBM CRYOGENICALLY PRESERVED: Type: IMPLANTABLE DEVICE | Site: SPINE LUMBAR | Status: FUNCTIONAL

## 2018-03-20 RX ORDER — LIDOCAINE HYDROCHLORIDE 10 MG/ML
0.1 INJECTION, SOLUTION EPIDURAL; INFILTRATION; INTRACAUDAL; PERINEURAL AS NEEDED
Status: DISCONTINUED | OUTPATIENT
Start: 2018-03-20 | End: 2018-03-20 | Stop reason: HOSPADM

## 2018-03-20 RX ORDER — GABAPENTIN 300 MG/1
300 CAPSULE ORAL 2 TIMES DAILY
Status: DISCONTINUED | OUTPATIENT
Start: 2018-03-20 | End: 2018-03-25 | Stop reason: HOSPADM

## 2018-03-20 RX ORDER — CEFAZOLIN SODIUM/WATER 2 G/20 ML
2 SYRINGE (ML) INTRAVENOUS ONCE
Status: DISCONTINUED | OUTPATIENT
Start: 2018-03-20 | End: 2018-03-20 | Stop reason: HOSPADM

## 2018-03-20 RX ORDER — ONDANSETRON 2 MG/ML
INJECTION INTRAMUSCULAR; INTRAVENOUS AS NEEDED
Status: DISCONTINUED | OUTPATIENT
Start: 2018-03-20 | End: 2018-03-20 | Stop reason: HOSPADM

## 2018-03-20 RX ORDER — MIDAZOLAM HYDROCHLORIDE 1 MG/ML
INJECTION, SOLUTION INTRAMUSCULAR; INTRAVENOUS AS NEEDED
Status: DISCONTINUED | OUTPATIENT
Start: 2018-03-20 | End: 2018-03-20 | Stop reason: HOSPADM

## 2018-03-20 RX ORDER — SODIUM CHLORIDE, SODIUM LACTATE, POTASSIUM CHLORIDE, CALCIUM CHLORIDE 600; 310; 30; 20 MG/100ML; MG/100ML; MG/100ML; MG/100ML
100 INJECTION, SOLUTION INTRAVENOUS CONTINUOUS
Status: DISCONTINUED | OUTPATIENT
Start: 2018-03-20 | End: 2018-03-20 | Stop reason: HOSPADM

## 2018-03-20 RX ORDER — LIDOCAINE HYDROCHLORIDE 20 MG/ML
INJECTION, SOLUTION EPIDURAL; INFILTRATION; INTRACAUDAL; PERINEURAL AS NEEDED
Status: DISCONTINUED | OUTPATIENT
Start: 2018-03-20 | End: 2018-03-20 | Stop reason: HOSPADM

## 2018-03-20 RX ORDER — SODIUM CHLORIDE 0.9 % (FLUSH) 0.9 %
5-10 SYRINGE (ML) INJECTION EVERY 8 HOURS
Status: DISCONTINUED | OUTPATIENT
Start: 2018-03-20 | End: 2018-03-20 | Stop reason: HOSPADM

## 2018-03-20 RX ORDER — VANCOMYCIN HYDROCHLORIDE 1 G/20ML
INJECTION, POWDER, LYOPHILIZED, FOR SOLUTION INTRAVENOUS AS NEEDED
Status: DISCONTINUED | OUTPATIENT
Start: 2018-03-20 | End: 2018-03-20 | Stop reason: HOSPADM

## 2018-03-20 RX ORDER — SUCCINYLCHOLINE CHLORIDE 20 MG/ML
INJECTION INTRAMUSCULAR; INTRAVENOUS AS NEEDED
Status: DISCONTINUED | OUTPATIENT
Start: 2018-03-20 | End: 2018-03-20 | Stop reason: HOSPADM

## 2018-03-20 RX ORDER — SODIUM CHLORIDE 9 MG/ML
125 INJECTION, SOLUTION INTRAVENOUS CONTINUOUS
Status: DISPENSED | OUTPATIENT
Start: 2018-03-20 | End: 2018-03-21

## 2018-03-20 RX ORDER — OXYCODONE AND ACETAMINOPHEN 5; 325 MG/1; MG/1
1 TABLET ORAL
COMMUNITY
End: 2018-03-25

## 2018-03-20 RX ORDER — FAMOTIDINE 20 MG/1
20 TABLET, FILM COATED ORAL 2 TIMES DAILY
Status: DISCONTINUED | OUTPATIENT
Start: 2018-03-20 | End: 2018-03-25 | Stop reason: HOSPADM

## 2018-03-20 RX ORDER — PROPOFOL 10 MG/ML
INJECTION, EMULSION INTRAVENOUS
Status: DISCONTINUED | OUTPATIENT
Start: 2018-03-20 | End: 2018-03-20 | Stop reason: HOSPADM

## 2018-03-20 RX ORDER — SODIUM CHLORIDE 0.9 % (FLUSH) 0.9 %
5-10 SYRINGE (ML) INJECTION AS NEEDED
Status: DISCONTINUED | OUTPATIENT
Start: 2018-03-20 | End: 2018-03-20 | Stop reason: HOSPADM

## 2018-03-20 RX ORDER — SODIUM CHLORIDE 0.9 % (FLUSH) 0.9 %
5-10 SYRINGE (ML) INJECTION EVERY 8 HOURS
Status: DISCONTINUED | OUTPATIENT
Start: 2018-03-21 | End: 2018-03-25 | Stop reason: HOSPADM

## 2018-03-20 RX ORDER — ROCURONIUM BROMIDE 10 MG/ML
INJECTION, SOLUTION INTRAVENOUS AS NEEDED
Status: DISCONTINUED | OUTPATIENT
Start: 2018-03-20 | End: 2018-03-20 | Stop reason: HOSPADM

## 2018-03-20 RX ORDER — ACETAMINOPHEN 325 MG/1
650 TABLET ORAL
Status: DISCONTINUED | OUTPATIENT
Start: 2018-03-20 | End: 2018-03-25 | Stop reason: HOSPADM

## 2018-03-20 RX ORDER — AMOXICILLIN 250 MG
1 CAPSULE ORAL 2 TIMES DAILY
Status: DISCONTINUED | OUTPATIENT
Start: 2018-03-20 | End: 2018-03-25 | Stop reason: HOSPADM

## 2018-03-20 RX ORDER — CEFAZOLIN SODIUM 1 G/3ML
INJECTION, POWDER, FOR SOLUTION INTRAMUSCULAR; INTRAVENOUS AS NEEDED
Status: DISCONTINUED | OUTPATIENT
Start: 2018-03-20 | End: 2018-03-20 | Stop reason: HOSPADM

## 2018-03-20 RX ORDER — DOCUSATE SODIUM 100 MG/1
100 CAPSULE, LIQUID FILLED ORAL 2 TIMES DAILY
Qty: 60 CAP | Refills: 2 | Status: SHIPPED | OUTPATIENT
Start: 2018-03-20

## 2018-03-20 RX ORDER — ONDANSETRON 2 MG/ML
4 INJECTION INTRAMUSCULAR; INTRAVENOUS
Status: DISCONTINUED | OUTPATIENT
Start: 2018-03-20 | End: 2018-03-25 | Stop reason: HOSPADM

## 2018-03-20 RX ORDER — ALLOPURINOL 100 MG/1
200 TABLET ORAL
Status: DISCONTINUED | OUTPATIENT
Start: 2018-03-20 | End: 2018-03-25 | Stop reason: HOSPADM

## 2018-03-20 RX ORDER — ATORVASTATIN CALCIUM 20 MG/1
20 TABLET, FILM COATED ORAL
Status: DISCONTINUED | OUTPATIENT
Start: 2018-03-20 | End: 2018-03-25 | Stop reason: HOSPADM

## 2018-03-20 RX ORDER — OXYCODONE HYDROCHLORIDE 5 MG/1
TABLET ORAL
Qty: 90 TAB | Refills: 0 | Status: SHIPPED | OUTPATIENT
Start: 2018-03-20

## 2018-03-20 RX ORDER — POLYETHYLENE GLYCOL 3350 17 G/17G
17 POWDER, FOR SOLUTION ORAL DAILY
Status: DISCONTINUED | OUTPATIENT
Start: 2018-03-21 | End: 2018-03-25 | Stop reason: HOSPADM

## 2018-03-20 RX ORDER — CYCLOBENZAPRINE HCL 10 MG
10 TABLET ORAL
Qty: 60 TAB | Refills: 2 | Status: SHIPPED | OUTPATIENT
Start: 2018-03-20

## 2018-03-20 RX ORDER — DIPHENHYDRAMINE HYDROCHLORIDE 50 MG/ML
12.5 INJECTION, SOLUTION INTRAMUSCULAR; INTRAVENOUS
Status: DISCONTINUED | OUTPATIENT
Start: 2018-03-20 | End: 2018-03-25 | Stop reason: HOSPADM

## 2018-03-20 RX ORDER — FACIAL-BODY WIPES
10 EACH TOPICAL DAILY PRN
Status: DISCONTINUED | OUTPATIENT
Start: 2018-03-22 | End: 2018-03-25 | Stop reason: HOSPADM

## 2018-03-20 RX ORDER — HYDROMORPHONE HYDROCHLORIDE 1 MG/ML
0.5 INJECTION, SOLUTION INTRAMUSCULAR; INTRAVENOUS; SUBCUTANEOUS
Status: ACTIVE | OUTPATIENT
Start: 2018-03-20 | End: 2018-03-21

## 2018-03-20 RX ORDER — HYDROMORPHONE HYDROCHLORIDE 1 MG/ML
.25-1 INJECTION, SOLUTION INTRAMUSCULAR; INTRAVENOUS; SUBCUTANEOUS
Status: DISCONTINUED | OUTPATIENT
Start: 2018-03-20 | End: 2018-03-20 | Stop reason: HOSPADM

## 2018-03-20 RX ORDER — NALOXONE HYDROCHLORIDE 0.4 MG/ML
0.4 INJECTION, SOLUTION INTRAMUSCULAR; INTRAVENOUS; SUBCUTANEOUS AS NEEDED
Status: DISCONTINUED | OUTPATIENT
Start: 2018-03-20 | End: 2018-03-25 | Stop reason: HOSPADM

## 2018-03-20 RX ORDER — FENTANYL CITRATE 50 UG/ML
INJECTION, SOLUTION INTRAMUSCULAR; INTRAVENOUS AS NEEDED
Status: DISCONTINUED | OUTPATIENT
Start: 2018-03-20 | End: 2018-03-20 | Stop reason: HOSPADM

## 2018-03-20 RX ORDER — CEFAZOLIN SODIUM/WATER 2 G/20 ML
2 SYRINGE (ML) INTRAVENOUS EVERY 8 HOURS
Status: COMPLETED | OUTPATIENT
Start: 2018-03-20 | End: 2018-03-21

## 2018-03-20 RX ORDER — OXYCODONE HYDROCHLORIDE 5 MG/1
5 TABLET ORAL
Status: DISCONTINUED | OUTPATIENT
Start: 2018-03-20 | End: 2018-03-25 | Stop reason: HOSPADM

## 2018-03-20 RX ORDER — HYDROMORPHONE HYDROCHLORIDE 2 MG/ML
INJECTION, SOLUTION INTRAMUSCULAR; INTRAVENOUS; SUBCUTANEOUS AS NEEDED
Status: DISCONTINUED | OUTPATIENT
Start: 2018-03-20 | End: 2018-03-20 | Stop reason: HOSPADM

## 2018-03-20 RX ORDER — SODIUM CHLORIDE 0.9 % (FLUSH) 0.9 %
5-10 SYRINGE (ML) INJECTION AS NEEDED
Status: DISCONTINUED | OUTPATIENT
Start: 2018-03-20 | End: 2018-03-25 | Stop reason: HOSPADM

## 2018-03-20 RX ORDER — OXYCODONE HYDROCHLORIDE 5 MG/1
10 TABLET ORAL
Status: DISCONTINUED | OUTPATIENT
Start: 2018-03-20 | End: 2018-03-25 | Stop reason: HOSPADM

## 2018-03-20 RX ORDER — HYDROMORPHONE HCL IN 0.9% NACL 15 MG/30ML
PATIENT CONTROLLED ANALGESIA VIAL INTRAVENOUS
Status: DISPENSED | OUTPATIENT
Start: 2018-03-20 | End: 2018-03-21

## 2018-03-20 RX ORDER — PROMETHAZINE HYDROCHLORIDE 25 MG/1
25 TABLET ORAL
Qty: 60 TAB | Refills: 2 | Status: SHIPPED | OUTPATIENT
Start: 2018-03-20

## 2018-03-20 RX ORDER — CYCLOBENZAPRINE HCL 10 MG
10 TABLET ORAL
Status: DISCONTINUED | OUTPATIENT
Start: 2018-03-20 | End: 2018-03-25 | Stop reason: HOSPADM

## 2018-03-20 RX ORDER — PROPOFOL 10 MG/ML
INJECTION, EMULSION INTRAVENOUS AS NEEDED
Status: DISCONTINUED | OUTPATIENT
Start: 2018-03-20 | End: 2018-03-20 | Stop reason: HOSPADM

## 2018-03-20 RX ADMIN — Medication 10 ML: at 23:02

## 2018-03-20 RX ADMIN — PROPOFOL 110 MCG/KG/MIN: 10 INJECTION, EMULSION INTRAVENOUS at 15:22

## 2018-03-20 RX ADMIN — HYDROMORPHONE HYDROCHLORIDE 0.5 MG: 2 INJECTION, SOLUTION INTRAMUSCULAR; INTRAVENOUS; SUBCUTANEOUS at 17:15

## 2018-03-20 RX ADMIN — FENTANYL CITRATE 50 MCG: 50 INJECTION, SOLUTION INTRAMUSCULAR; INTRAVENOUS at 15:17

## 2018-03-20 RX ADMIN — HYDROMORPHONE HYDROCHLORIDE 0.5 MG: 2 INJECTION, SOLUTION INTRAMUSCULAR; INTRAVENOUS; SUBCUTANEOUS at 18:36

## 2018-03-20 RX ADMIN — ROCURONIUM BROMIDE 5 MG: 10 INJECTION, SOLUTION INTRAVENOUS at 15:17

## 2018-03-20 RX ADMIN — CEFAZOLIN SODIUM 2 G: 1 INJECTION, POWDER, FOR SOLUTION INTRAMUSCULAR; INTRAVENOUS at 15:22

## 2018-03-20 RX ADMIN — GABAPENTIN 300 MG: 300 CAPSULE ORAL at 23:01

## 2018-03-20 RX ADMIN — FENTANYL CITRATE 50 MCG: 50 INJECTION, SOLUTION INTRAMUSCULAR; INTRAVENOUS at 15:12

## 2018-03-20 RX ADMIN — Medication: at 20:00

## 2018-03-20 RX ADMIN — SODIUM CHLORIDE, POTASSIUM CHLORIDE, SODIUM LACTATE AND CALCIUM CHLORIDE: 600; 310; 30; 20 INJECTION, SOLUTION INTRAVENOUS at 14:30

## 2018-03-20 RX ADMIN — FENTANYL CITRATE 50 MCG: 50 INJECTION, SOLUTION INTRAMUSCULAR; INTRAVENOUS at 17:45

## 2018-03-20 RX ADMIN — SODIUM CHLORIDE, POTASSIUM CHLORIDE, SODIUM LACTATE AND CALCIUM CHLORIDE: 600; 310; 30; 20 INJECTION, SOLUTION INTRAVENOUS at 16:43

## 2018-03-20 RX ADMIN — SODIUM CHLORIDE, POTASSIUM CHLORIDE, SODIUM LACTATE AND CALCIUM CHLORIDE: 600; 310; 30; 20 INJECTION, SOLUTION INTRAVENOUS at 19:37

## 2018-03-20 RX ADMIN — ATORVASTATIN CALCIUM 20 MG: 20 TABLET, FILM COATED ORAL at 23:01

## 2018-03-20 RX ADMIN — SUCCINYLCHOLINE CHLORIDE 100 MG: 20 INJECTION INTRAMUSCULAR; INTRAVENOUS at 15:17

## 2018-03-20 RX ADMIN — ROCURONIUM BROMIDE 35 MG: 10 INJECTION, SOLUTION INTRAVENOUS at 15:36

## 2018-03-20 RX ADMIN — SODIUM CHLORIDE, POTASSIUM CHLORIDE, SODIUM LACTATE AND CALCIUM CHLORIDE: 600; 310; 30; 20 INJECTION, SOLUTION INTRAVENOUS at 18:00

## 2018-03-20 RX ADMIN — FENTANYL CITRATE 50 MCG: 50 INJECTION, SOLUTION INTRAMUSCULAR; INTRAVENOUS at 15:58

## 2018-03-20 RX ADMIN — HYDROMORPHONE HYDROCHLORIDE 0.5 MG: 2 INJECTION, SOLUTION INTRAMUSCULAR; INTRAVENOUS; SUBCUTANEOUS at 19:08

## 2018-03-20 RX ADMIN — PROPOFOL 200 MG: 10 INJECTION, EMULSION INTRAVENOUS at 15:17

## 2018-03-20 RX ADMIN — Medication 2 G: at 23:01

## 2018-03-20 RX ADMIN — SODIUM CHLORIDE 125 ML/HR: 900 INJECTION, SOLUTION INTRAVENOUS at 20:04

## 2018-03-20 RX ADMIN — HYDROMORPHONE HYDROCHLORIDE 0.5 MG: 2 INJECTION, SOLUTION INTRAMUSCULAR; INTRAVENOUS; SUBCUTANEOUS at 18:58

## 2018-03-20 RX ADMIN — FENTANYL CITRATE 50 MCG: 50 INJECTION, SOLUTION INTRAMUSCULAR; INTRAVENOUS at 16:45

## 2018-03-20 RX ADMIN — HYDROMORPHONE HYDROCHLORIDE 0.5 MG: 1 INJECTION, SOLUTION INTRAMUSCULAR; INTRAVENOUS; SUBCUTANEOUS at 20:56

## 2018-03-20 RX ADMIN — ONDANSETRON 4 MG: 2 INJECTION INTRAMUSCULAR; INTRAVENOUS at 18:12

## 2018-03-20 RX ADMIN — FAMOTIDINE 20 MG: 20 TABLET, FILM COATED ORAL at 23:01

## 2018-03-20 RX ADMIN — MIDAZOLAM HYDROCHLORIDE 3 MG: 1 INJECTION, SOLUTION INTRAMUSCULAR; INTRAVENOUS at 15:12

## 2018-03-20 RX ADMIN — LIDOCAINE HYDROCHLORIDE 40 MG: 20 INJECTION, SOLUTION EPIDURAL; INFILTRATION; INTRACAUDAL; PERINEURAL at 15:17

## 2018-03-20 RX ADMIN — STANDARDIZED SENNA CONCENTRATE AND DOCUSATE SODIUM 1 TABLET: 8.6; 5 TABLET, FILM COATED ORAL at 23:01

## 2018-03-20 RX ADMIN — ALLOPURINOL 200 MG: 100 TABLET ORAL at 23:01

## 2018-03-20 NOTE — ANESTHESIA PREPROCEDURE EVALUATION
Anesthetic History   No history of anesthetic complications            Review of Systems / Medical History  Patient summary reviewed, nursing notes reviewed and pertinent labs reviewed    Pulmonary  Within defined limits                 Neuro/Psych             Comments: PAIN = 3/10  Right hip to right foot Cardiovascular              Hyperlipidemia    Exercise tolerance: >4 METS     GI/Hepatic/Renal                Endo/Other    Diabetes: poorly controlled, type 2    Arthritis    Comments: Gout Other Findings              Physical Exam    Airway  Mallampati: II    Neck ROM: normal range of motion   Mouth opening: Normal     Cardiovascular    Rhythm: regular  Rate: normal         Dental  No notable dental hx       Pulmonary  Breath sounds clear to auscultation               Abdominal         Other Findings            Anesthetic Plan    ASA: 3  Anesthesia type: general          Induction: Intravenous  Anesthetic plan and risks discussed with: Patient      Informed consent obtained.

## 2018-03-20 NOTE — H&P
Date of Surgery Update:  Kaylene Rothman was seen and examined. History and physical has been reviewed. The patient has been examined.  There have been no significant clinical changes since the completion of the originally dated History and Physical.    Signed By: Violeta Ching MD     March 20, 2018 2:59 PM

## 2018-03-20 NOTE — IP AVS SNAPSHOT
[address] [facility], [place], operated by [facility] 
 
 
 [address] 1007 [facility] 
[phone] Patient: Analia Norton MRN: [id number] :1955 About your hospitalization You were admitted on:  2018 You last received care in the:  [facility] 4M POST SURG ORT 2 You were discharged on:  2018 Why you were hospitalized Your primary diagnosis was:  Lumbar Stenosis With Neurogenic Claudication Your diagnoses also included:  Saddle Embolus Of Pulmonary Artery (Hcc), Hyperlipidemia, Gout, Anemia Follow-up Information Follow up With Details Comments Contact Info ETIENNE Vann In 3 weeks As previously scheduled [address] [facility] 103 1007 [facility] 
[phone] [address]  [phone] Neno Wiggins MD Schedule an appointment as soon as possible for a visit in 1 week  [address] [facility] 200 [address] 
[phone] Park Jama MD   Patient can only remember the practice name and not the physician Discharge Orders None A check darius indicates which time of day the medication should be taken. My Medications START taking these medications Instructions Each Dose to Equal  
 Morning Noon Evening Bedtime  
 cyclobenzaprine 10 mg tablet Commonly known as:  FLEXERIL Your last dose was: This medication has not been taken in the hospital.   
Your next dose is:  Start anytime as needed Take 1 Tab by mouth three (3) times daily as needed for Muscle Spasm(s). 10 mg  
    
   
   
   
  
 docusate sodium 100 mg capsule Commonly known as:  Altaf Monteiro Your last dose was:  A similar medication was taken 3/25/18 08:50 a.m. Your next dose is:  3/25/18 Evening dose Take 1 Cap by mouth two (2) times a day. 100 mg  
    
   
   
   
  
 oxyCODONE IR 5 mg immediate release tablet Commonly known as:  Faisal Pereyra  
 Your last dose was:  3/25/18 8:12a.m. Your next dose is:  Start anytime as needed. Take 1-2 tablets PO every 4 to 6 hours prn post-operative pain  
     
   
   
   
  
 promethazine 25 mg tablet Commonly known as:  PHENERGAN Your last dose was: This medication has not been taken in the hospital.   
Your next dose is:  Start anytime as needed Take 1 Tab by mouth every six (6) hours as needed for Nausea. 25 mg  
    
   
   
   
  
 rivaroxaban 15 mg Tab tablet Commonly known as:  Franchesca Jonna Your last dose was: This Morning Your next dose is: This evening with dinner. Take 1 Tab by mouth two (2) times daily (with meals). 15 mg CONTINUE taking these medications Instructions Each Dose to Equal  
 Morning Noon Evening Bedtime  
 allopurinol 100 mg tablet Commonly known as:  Juan Arnett Your last dose was:  3/24/18 10:19 p.m. Your next dose is:  3/25/18 Night time dose Take 200 mg by mouth nightly. 200 mg  
    
   
   
   
  
 atorvastatin 20 mg tablet Commonly known as:  LIPITOR Your last dose was:  3/24/18 10:19 p.m. Your next dose is:  3/25/18 Night time dose Take 20 mg by mouth nightly. 20 mg  
    
   
   
   
  
 gabapentin 300 mg capsule Commonly known as:  NEURONTIN Your last dose was:  3/25/18 8:51 a.m. Your next dose is:  3/25/18 Evening dose. Take 300 mg by mouth two (2) times a day. 300 mg  
    
   
   
   
  
  
STOP taking these medications   
 oxyCODONE-acetaminophen 5-325 mg per tablet Commonly known as:  PERCOCET Where to Get Your Medications Information on where to get these meds will be given to you by the nurse or doctor. ! Ask your nurse or doctor about these medications  
  cyclobenzaprine 10 mg tablet  
 docusate sodium 100 mg capsule  
 oxyCODONE IR 5 mg immediate release tablet  
 promethazine 25 mg tablet  
 rivaroxaban 15 mg Tab tablet Discharge Instructions Lumbar Spinal Surgery Discharge Instructions Dr. Martha Barrientos [address] [place] [address] [phone] Activity:   
? You are going home a well person, be as active as possible. Your only exercise should be walking. Start with short frequent walks and increase your walking distance each day. Start with walking twice a day for 5 minutes and increase your distance each day 2-3 minutes until you reach 25 minutes twice a day. Limit the amount of time you sit to 20-30 minute intervals. Sitting for prolonged periods of time will be uncomfortable for you following your surgery. ? No bending, lifting (of 5lbs or more), twisting, or straining. ? Do not drive until your doctor states you may do so. However, you may ride in a car for short distances. ? If you are required to wear a brace, you should wear it at all times when you are out of bed. You may remove it when sleeping unless your physician advises you against it. ? When you are in the bed, you may lay on your back or on either side. Do not lie on your stomach. ? You may resume sexual relations 3-4 weeks after surgery depending on how you are feeling. ? Continue to use your incentive spirometer for deep breathing exercises. Driving: ? You may not drive or return to work until instructed by your physician. However, you may ride in the car for short periods of time. Brace: ? If you have a back brace, you should wear your brace at all times when you are out of bed. Do not wear the brace while in bed or showering. ? Remember to always wear a cotton t-shirt underneath your brace. ? Do not bend or twist when your brace is off. Diet: 
? You may resume your regular home diet as tolerated. If your throat is sore, you should eat soft foods for the first couple of days. ? Be sure to drink plenty of fluids; it is important to keep yourself hydrated. ? Avoid alcoholic beverages and ABSOLUTELY NO tobacco products. Tobacco products will interfere with your healing. If you continue to use tobacco, you may end up needing another surgery in the future. Dressing: You have on a Prineo dressing. This is a waterproof bacteriostatic dressing that requires no post-operative care. Please do not peel the dressing off, or apply any oil based products, as they may expedite the deterioration of the mesh. The dressing will slowly chip off on its own. 
? Do not rub or apply lotion or ointments to incision site. Shower: ? You may shower 5 days after your surgery. ? You may remove your brace during showers. ? NO not use tub baths, swimming pools or Jacuzzis. Medications: 
? Check with your physician before taking any anti-inflammatory medications. (Advil, Aleve, Ibuprofen, Aspirin) ? Take your pain medication as directed ? Do NOT take additional Tylenol if your prescribed pain medication has acetaminophen in it (Endocet/Percocet, Lortab, Norco) ? It is important to have regular bowel movements. Pain medications can be constipating. Stool softeners, warm prune juice, increasing your water intake, and increasing fiber in your diet can help in preventing constipation. ? Do NOT take laxatives if at all possible except in severe situations. It can result in a vicious cycle of constipation and diarrhea. Stockings: ? You have been given T.E.D. stockings to wear. Continue to wear these for 7 days after your discharge. Put them on in the morning and take them off at night. They are used to increase your circulation and prevent blood clots from forming in your legs. Follow-Up ? Please call ASAP to schedule your 1st post-operative appointment. This should be approximately 3 weeks from your surgery date. Notify your physician in you develop any of the following conditions: 
? Fever above 101 degrees for 24 hours. ? Nausea or vomiting. ? Severe headache. ? Inability to urinate ? Loss of bowel or bladder function (sudden onset of incontinence) ? Changes in sensation in your extremities (numbness, tingling, loss of color). ? Severe pain or pain not relieved by medications. ? Redness, swelling, or drainage from your incision. ? Persistent pain in the chest.  
? Pain in the calf of either leg. 
? Increased weakness (if this is greater than before your surgery). If you have any questions about your dressing contact your Orthopaedic Surgeons office. OFFICE OF DR. Barak Martin   [phone] [place] [address] [facility] [address] [facility], [address] [facility] [address] STOP TAKING ANY PREVIOUSLY PRESCRIBED PAIN MEDICATION. YOU WERE GIVEN A NEW PRESCRIPTION FOR PAIN MEDICATION UPON DISCHARGE FROM THE HOSPITAL TODAY 
 
** IMPORTANT: UNDER YOUR HONEYCOMB DRESSING, YOU HAVE A PRINEO ADHESIVE MESH DIRECTLY OVER YOUR INCISION. THIS MESH WAS STERILELY GLUED TO YOUR SKIN DURING SURGERY. DO NOT REMOVE THIS. NO ONE ELSE SHOULD REMOVE IT EITHER. IT WILL COME OFF ON ITS OWN OVER TIME 
 
YOUR HONEYCOMB DRESSING NEEDS TO BE REMOVED PRIOR TO SHOWERING. THEN THE PRINEO MESH CAN BE LEFT OPEN TO AIR * WEAR YOUR BRACE AS ADVISED * NO DRIVING UNTIL YOU ARE CLEARED TO DO SO BY YOUR SURGEON 
 
* LIMIT LIFTING, BENDING AND TWISTING. NO LIFTING MORE THAN 5 LBS * MAKE SURE YOU ARE GETTING GOOD NUTRITION (Lean Protein, Vitamin D AND Calcium) * DO NOT TAKE ANY NSAIDs FOR THE FIRST 3 MONTHS AFTER SURGERY (such as Advil/Ibuprofen/Motrin, Aleve/Naproxen/Naprosyn, Diclofenac, Celebrex, Meloxicam, Indomethacin, Goody's powder, BC powder etc.) * NO NICOTINE PRODUCTS * FULLY READ YOUR DISCHARGE INSTRUCTIONS Introducing \[facility]\"" & HEALTH SERVICES! [employer] introduces [employer] patient portal. Now you can access parts of your medical record, email your doctor's office, and request medication refills online.    
 
1. In your internet browser, go to [email or web address] 2. Click on the First Time User? Click Here link in the Sign In box. You will see the New Member Sign Up page. 3. Enter your TheTakes Access Code exactly as it appears below. You will not need to use this code after youve completed the sign-up process. If you do not sign up before the expiration date, you must request a new code. · TheTakes Access Code: [email or web address] Expires: 5/27/2018 11:06 PM 
 
4. Enter the last four digits of your Social Security Number (xxxx) and Date of Birth (mm/dd/yyyy) as indicated and click Submit. You will be taken to the next sign-up page. 5. Create a TheTakes ID. This will be your TheTakes login ID and cannot be changed, so think of one that is secure and easy to remember. 6. Create a TheTakes password. You can change your password at any time. 7. Enter your Password Reset Question and Answer. This can be used at a later time if you forget your password. 8. Enter your e-mail address. You will receive e-mail notification when new information is available in [address] 9. Click Sign Up. You can now view and download portions of your medical record. 10. Click the Download Summary menu link to download a portable copy of your medical information. If you have questions, please visit the Frequently Asked Questions section of the TheTakes website. Remember, TheTakes is NOT to be used for urgent needs. For medical emergencies, dial 911. Now available from your iPhone and Android! Unresulted Labs-Please follow up with your PCP about these lab tests Order Current Status FACTOR V LEIDEN In process Providers Seen During Your Hospitalization Provider Specialty Primary office phone Reza Dewitt MD Orthopedic Surgery [phone] Your Primary Care Physician (PCP) Primary Care Physician Office Phone Office Fax OTHER, PHYS ** None ** ** None ** You are allergic to the following No active allergies Recent Documentation Weight BMI Smoking Status 97.8 kg 28.45 kg/m2 Never Smoker Emergency Contacts Name Discharge Info Relation Home Work Mobile Roxana Garibay DISCHARGE CAREGIVER [3] Spouse [3] [phone] Patient Belongings The following personal items are in your possession at time of discharge: 
  Dental Appliances: None  Visual Aid: Glasses      Home Medications: None   Jewelry: None  Clothing: Other (comment) (street clothes to [employer])    Other Valuables: Wheelchair, Other (comment) (given back to wife) Discharge Instructions Attachments/References PULMONARY EMBOLISM (ENGLISH) DVT (DEEP VEIN THROMBOSIS) (ENGLISH) RIVAROXABAN (BY MOUTH) (ENGLISH) Patient Handouts Pulmonary Embolism: Care Instructions Your Care Instructions Pulmonary embolism is the sudden blockage of an artery in the lung. Blood clots in the deep veins of the leg or pelvis (deep vein thrombosis, or DVT) are the most common cause. These blood clots can travel to the lungs. Pulmonary embolism can be very serious. Because you have had one pulmonary embolism, you are at greater risk for having another one. But you can take steps to prevent another pulmonary embolism by following your doctor's instructions. You will probably take a prescription blood-thinning medicine to prevent blood clots. A blood thinner can stop a blood clot from growing larger and prevent new clots from forming. Follow-up care is a key part of your treatment and safety. Be sure to make and go to all appointments, and call your doctor if you are having problems. It's also a good idea to know your test results and keep a list of the medicines you take. How can you care for yourself at home? · Take your medicines exactly as prescribed. Call your doctor if you think you are having a problem with your medicine.  You will get more details on the specific medicines your doctor prescribes. · If you are taking a blood thinner, be sure you get instructions about how to take your medicine safely. Blood thinners can cause serious bleeding problems. Preventing future pulmonary embolisms · Exercise. Keep blood moving in your legs to keep clots from forming. If you are traveling by car, stop every hour or so. Get out and walk around for a few minutes. If you are traveling by bus, train, or plane, get out of your seat and walk up and down the aisles every hour or so. You also can do leg exercises while you are seated. Pump your feet up and down by pulling your toes up toward your knees then pointing them down. · Get up out of bed as soon as possible after an illness or surgery. · Do not smoke. If you need help quitting, talk to your doctor about stop-smoking programs and medicines. These can increase your chances of quitting for good. · Check with your doctor before taking hormone or birth control pills. These may increase your risk of blot clots. · Ask your doctor about wearing compression stockings to help prevent blood clots in your legs. There are different types of stockings, and they need to fit right. So your doctor will recommend what you need. When should you call for help? Call 911 anytime you think you may need emergency care. For example, call if: 
? · You have shortness of breath. ? · You have chest pain. ? · You passed out (lost consciousness). ? · You cough up blood. ?Call your doctor now or seek immediate medical care if: 
? · You have new or worsening pain or swelling in your leg. ? Watch closely for changes in your health, and be sure to contact your doctor if: 
? · You do not get better as expected. Where can you learn more? Go to http://amy-manuel.info/. Enter O671 in the search box to learn more about \"Pulmonary Embolism: Care Instructions. \" Current as of: March 20, 2017 Content Version: 11.4 © 5114-3291 [employer]. Care instructions adapted under license by [employer] (which disclaims liability or warranty for this information). If you have questions about a medical condition or this instruction, always ask your healthcare professional. [address] any warranty or liability for your use of this information. Deep Vein Thrombosis: Care Instructions Your Care Instructions A deep vein thrombosis (DVT) is a blood clot in certain veins of the legs, pelvis, or arms. Blood clots in these veins need to be treated because they can get bigger, break loose, and travel through the bloodstream to the lungs. A blood clot in a lung can be life-threatening. The doctor may have given you a blood thinner (anticoagulant). A blood thinner can stop the blood clot from growing larger and prevent new clots from forming. You will need to take a blood thinner for 3 to 6 months or longer. The doctor has checked you carefully, but problems can develop later. If you notice any problems or new symptoms, get medical treatment right away. Follow-up care is a key part of your treatment and safety. Be sure to make and go to all appointments, and call your doctor if you are having problems. It's also a good idea to know your test results and keep a list of the medicines you take. How can you care for yourself at home? · Take your medicines exactly as prescribed. Call your doctor if you think you are having a problem with your medicine. · If you are taking a blood thinner, be sure you get instructions about how to take your medicine safely. Blood thinners can cause serious bleeding problems. · Wear compression stockings if your doctor recommends them. These stockings are tighter at the feet than on the legs. They may reduce pain and swelling in your legs.  But there are different types of stockings, and they need to fit right. So your doctor will recommend what you need. · When you sit, use a pillow to raise the arm or leg that has the blood clot. Try to keep it above the level of your heart. When should you call for help? Call 911 anytime you think you may need emergency care. For example, call if: 
? · You passed out (lost consciousness). ? · You have symptoms of a blood clot in your lung (called a pulmonary embolism). These include: 
¨ Sudden chest pain. ¨ Trouble breathing. ¨ Coughing up blood. ?Call your doctor now or seek immediate medical care if: 
? · You have new or worse trouble breathing. ? · You are dizzy or lightheaded, or you feel like you may faint. ? · You have symptoms of a blood clot in your arm or leg. These may include: 
¨ Pain in the arm, calf, back of the knee, thigh, or groin. ¨ Redness and swelling in the arm, leg, or groin. ? Watch closely for changes in your health, and be sure to contact your doctor if: 
? · You do not get better as expected. Where can you learn more? Go to http://amy-manuel.info/. Enter I764 in the search box to learn more about \"Deep Vein Thrombosis: Care Instructions. \" Current as of: March 20, 2017 Content Version: 11.4 © 5579-3886 [employer]. Care instructions adapted under license by [employer] (which disclaims liability or warranty for this information). If you have questions about a medical condition or this instruction, always ask your healthcare professional. [place] [address] any warranty or liability for your use of this information. Rivaroxaban (By mouth) Rivaroxaban ([id number]-[id number]-a-ban) Treats and prevents blood clots, which lowers the risk of stroke, deep vein thrombosis (DVT), pulmonary embolism (PE), and similar conditions. This medicine is a blood thinner. Brand Name(s): Xarelto, Xarelto Starter Pack There may be other brand names for this medicine. When This Medicine Should Not Be Used: This medicine is not right for everyone. Do not use it if you had an allergic reaction to rivaroxaban, or you have severe bleeding. How to Use This Medicine:  
Tablet · Take this medicine as directed, and take it at the same time each day. · 10-milligram (mg) tablet: Take with or without food. · 15-mg or 20-mg tablet: Take with food. · If you cannot swallow the tablets, you may crush the tablet and mix it with applesauce. Eat some food after you swallow the mixture. · Tube feeding: You may crush the tablet and mix the medicine in 50 milliliters (mL) of water before giving it via the tube. This must be followed by a feeding. · This medicine should come with a Medication Guide. Ask your pharmacist for a copy if you do not have one. · Missed dose: ¨ Ask your doctor or pharmacist if you are not sure what to do if you miss a dose. ¨ Once-daily dose: If you miss a dose or forget to use your medicine, use it as soon as you can on the same day. Do not use extra medicine to make up for a missed dose. ¨ Twice-daily dose to treat a blood clot (15-mg tablet): If you miss a dose or forget to use your medicine, use it as soon as you can on the same day. You may take 2 doses at the same time to make up for the missed dose. This is only for people who take a total of 30 mg per day. · Store the medicine in a closed container at room temperature, away from heat, moisture, and direct light. Drugs and Foods to Avoid: Ask your doctor or pharmacist before using any other medicine, including over-the-counter medicines, vitamins, and herbal products. · Some foods and medicines can affect how rivaroxaban works. Tell your doctor if you are using any of the following: ¨ NSAID medicine (including aspirin, celecoxib, diclofenac, ibuprofen, naproxen) ¨ Ketoconazole, itraconazole, lopinavir, ritonavir, indinavir, conivaptan, carbamazepine, phenytoin, rifampin, Elisabet's wort ¨ Another blood thinner (including clopidogrel, enoxaparin, heparin, warfarin) Warnings While Using This Medicine: · Tell your doctor if you are pregnant or breastfeeding, or if you have kidney disease, liver disease, bleeding problems, or an artificial heart valve. · This medicine may increase your risk of bleeding. Be careful to avoid injuries that could cause bleeding. Stay away from rough sports or other situations where you could be bruised, cut, or hurt. Brush and floss your teeth gently. Be careful when using sharp objects, including razors and fingernail clippers. Avoid picking your nose. If you need to blow your nose, blow it gently. · This medicine may cause nerve damage if you have a medical procedure done to your back, including anesthesia or a spinal puncture. This is more likely to happen if you have a history of back injury, back surgery, problems with your spine, or procedures or punctures to your back. Tell your doctor if you are also taking another blood thinner, because this also increases the risk. · Do not stop using this medicine suddenly without asking your doctor. You might have a higher risk of stroke for a short time after you stop using this medicine. · Tell any doctor or dentist who treats you that you are using this medicine. · Your doctor will do lab tests at regular visits to check on the effects of this medicine. Keep all appointments. · Keep all medicine out of the reach of children. Never share your medicine with anyone. Possible Side Effects While Using This Medicine:  
Call your doctor right away if you notice any of these side effects: · Allergic reaction: Itching or hives, swelling in your face or hands, swelling or tingling in your mouth or throat, chest tightness, trouble breathing · Blistering, peeling, or red skin rash · Decrease in how much or how often you urinate · Heavy menstrual bleeding, or vaginal bleeding · Red or brown urine, bloody or black, tarry stools · Unusual bleeding or bruising, including frequent nosebleeds · Vomiting blood or material that looks like coffee grounds If you notice other side effects that you think are caused by this medicine, tell your doctor. Call your doctor for medical advice about side effects. You may report side effects to FDA at 9-803-FDA-6761 © 2017 [address]  Information is for End User's use only and may not be sold, redistributed or otherwise used for commercial purposes. The above information is an  only. It is not intended as medical advice for individual conditions or treatments. Talk to your doctor, nurse or pharmacist before following any medical regimen to see if it is safe and effective for you. Please provide this summary of care documentation to your next provider. Signatures-by signing, you are acknowledging that this After Visit Summary has been reviewed with you and you have received a copy. Patient Signature:  ____________________________________________________________ Date:  ____________________________________________________________  
  
Ferny Rouse Provider Signature:  ____________________________________________________________ Date:  ____________________________________________________________

## 2018-03-20 NOTE — OP NOTES
[address] [facility]  [address] [facility], [address] [facility]    OPERATIVE REPORT      NAME: Rancho Harvey    AGE: 58 y.o. YOB: 1955    MEDICAL RECORD NUMBER: [id number]    DATE OF SURGERY: 3/20/2018    PREOPERATIVE DIAGNOSES:  1. Lumbar stenosis  2. Acquired lumbar spondylolisthesis    POSTOPERATIVE DIAGNOSES:  1. Lumbar stenosis   2. Acquired lumbar spondylolisthesis     OPERATIVE PROCEDURES:   1. Laminectomy, partial facetectomy, and foraminotomy of L4.   2. Laminectomy, partial facetectomy, and foraminotomy of L5.   3. Laminectomy of S1.  4. Posterolateral fusion and posterior lumbar interbody fusion of L4-L5. 5. Posterolateral fusion and posterior lumbar interbody fusion of L5-S1.   6. Pedicle screw instrumentation with [place] pedicle screws for L4, L5, and S1 bilaterally. 7. Application of biomechanical interbody device at L5-S1 and L4-L5. 8. Morselized allograft for spine surgery and local autograft for spine surgery with stem cells. SURGEON: Jerri Cosme MD     ASSISTANT: ETIENNE Arreola    ANESTHESIA: General    COMPLICATIONS: None apparent     NEUROMONITORING: We used SSEPs and spontaneous EMGs. We also stimulated the pedicle screws. ESTIMATED BLOOD LOSS: 200    INDICATION FOR PROCEDURE: The patient is a very pleasant 58 y.o. male with debilitating back pain and leg pain. He failed conservative measures. He elected to proceed with operative intervention. He was aware of the risks, benefits, and alternatives. He provided informed consent. PROCEDURE: The patient was identified in the preoperative holding area. His lumbar spine was marked by me. He was transferred to the operating room where general anesthesia was given. He was also given perioperative antibiotics. He was placed prone on the [facility] table. All bony prominences were well padded. The lumbar spine was prepped and draped in the usual standard fashion.  We performed a surgical timeout. I made a skin incision in the midline. I exposed the posterior lumbar spine. I took intraoperative fluoroscopy to verify our levels. I exposed the transverse processes of L4, L5, and S1 bilaterally. I then began my decompression by performing a laminectomy of L4 and L5. I also performed a partial facetectomy and foraminotomy to decompress the thecal sac and the roots of L4 and L5 bilaterally. I performed a transforaminal approach to the right L5-S1 disk space. I also performed a laminectomy of S1 to decompress the stenosis located over the thecal sac of S1. I performed an identical transforaminal approach to the right L4-L5 disk space. I then performed a standard diskectomy at L5-S1 and L4-L5. I prepared the endplates to bleeding bone. I performed trial sizing. I placed the appropriately sized biomechanical device into L5-S1 and into L4-L5 with the appropriate amount of tension and alignment. The biomechanical devices were packed with stem cells and morselized allograft. I then cannulated our pedicles for L4, L5, and S1 bilaterally in standard fashion. I probed each pedicle. I copiously irrigated the entire wound. I decorticated our fusion beds bilaterally with a high speed bur. I placed our bone graft into our fusion beds bilaterally. I then placed pedicle screws for L4, L5, and S1 bilaterally in standard fashion under fluoroscopic guidance. I had good purchase for each pedicle screw. I stimulated all the screws with pedicle screw stimulation. The pedicle screws were found to be within the appropriate range of amplitude. I then placed contoured rods on top of the pedicle screws bilaterally. The rods were locked to the screws according to the 's specification. We had good hemostasis. There was no CSF leaking. The fusion beds were packed with morselized allograft and local autograft. The exposed neurologic elements were protected with Duragen.  I injected the soft tissues with a pain management cocktail. A deep drain was placed. The wound was closed in several layers. A sterile dressing was applied. The patient was extubated and transferred to the recovery room in good medical condition. Dr. Katerin MEDRANO, performed the above procedure.      Katerin Flores MD  3/20/2018

## 2018-03-21 ENCOUNTER — APPOINTMENT (OUTPATIENT)
Dept: CT IMAGING | Age: 63
DRG: 453 | End: 2018-03-21
Attending: NURSE PRACTITIONER
Payer: COMMERCIAL

## 2018-03-21 PROBLEM — I26.92 SADDLE EMBOLUS OF PULMONARY ARTERY (HCC): Status: ACTIVE | Noted: 2018-03-21

## 2018-03-21 PROBLEM — D64.9 ANEMIA: Status: ACTIVE | Noted: 2018-03-21

## 2018-03-21 LAB
ANION GAP SERPL CALC-SCNC: 4 MMOL/L (ref 5–15)
APTT PPP: 26.4 SEC (ref 22.1–32)
ATRIAL RATE: 97 BPM
BASOPHILS # BLD: 0 K/UL (ref 0–0.1)
BASOPHILS NFR BLD: 0 % (ref 0–1)
BUN SERPL-MCNC: 12 MG/DL (ref 6–20)
BUN/CREAT SERPL: 13 (ref 12–20)
CALCIUM SERPL-MCNC: 9.6 MG/DL (ref 8.5–10.1)
CALCULATED P AXIS, ECG09: 56 DEGREES
CALCULATED R AXIS, ECG10: 84 DEGREES
CALCULATED T AXIS, ECG11: 23 DEGREES
CHLORIDE SERPL-SCNC: 106 MMOL/L (ref 97–108)
CO2 SERPL-SCNC: 30 MMOL/L (ref 21–32)
CREAT SERPL-MCNC: 0.91 MG/DL (ref 0.7–1.3)
DIAGNOSIS, 93000: NORMAL
DIFFERENTIAL METHOD BLD: ABNORMAL
EOSINOPHIL # BLD: 0.1 K/UL (ref 0–0.4)
EOSINOPHIL NFR BLD: 1 % (ref 0–7)
ERYTHROCYTE [DISTWIDTH] IN BLOOD BY AUTOMATED COUNT: 13.4 % (ref 11.5–14.5)
FERRITIN SERPL-MCNC: 445 NG/ML (ref 26–388)
FOLATE SERPL-MCNC: 9.2 NG/ML (ref 5–21)
GLUCOSE SERPL-MCNC: 143 MG/DL (ref 65–100)
HAPTOGLOB SERPL-MCNC: 135 MG/DL (ref 30–200)
HCT VFR BLD AUTO: 32.3 % (ref 36.6–50.3)
HGB BLD-MCNC: 10.7 G/DL (ref 12.1–17)
HGB BLD-MCNC: 11.2 G/DL (ref 12.1–17)
IMM GRANULOCYTES # BLD: 0.1 K/UL (ref 0–0.04)
IMM GRANULOCYTES NFR BLD AUTO: 1 % (ref 0–0.5)
IRON SATN MFR SERPL: 9 % (ref 20–50)
IRON SERPL-MCNC: 21 UG/DL (ref 35–150)
LYMPHOCYTES # BLD: 1.3 K/UL (ref 0.8–3.5)
LYMPHOCYTES NFR BLD: 15 % (ref 12–49)
MCH RBC QN AUTO: 28.2 PG (ref 26–34)
MCHC RBC AUTO-ENTMCNC: 33.1 G/DL (ref 30–36.5)
MCV RBC AUTO: 85.2 FL (ref 80–99)
MONOCYTES # BLD: 0.8 K/UL (ref 0–1)
MONOCYTES NFR BLD: 9 % (ref 5–13)
NEUTS SEG # BLD: 6.4 K/UL (ref 1.8–8)
NEUTS SEG NFR BLD: 74 % (ref 32–75)
NRBC # BLD: 0 K/UL (ref 0–0.01)
NRBC BLD-RTO: 0 PER 100 WBC
P-R INTERVAL, ECG05: 182 MS
PLATELET # BLD AUTO: 141 K/UL (ref 150–400)
PMV BLD AUTO: 9.4 FL (ref 8.9–12.9)
POTASSIUM SERPL-SCNC: 4.8 MMOL/L (ref 3.5–5.1)
Q-T INTERVAL, ECG07: 312 MS
QRS DURATION, ECG06: 78 MS
QTC CALCULATION (BEZET), ECG08: 396 MS
RBC # BLD AUTO: 3.79 M/UL (ref 4.1–5.7)
SODIUM SERPL-SCNC: 140 MMOL/L (ref 136–145)
THERAPEUTIC RANGE,PTTT: NORMAL SECS (ref 58–77)
TIBC SERPL-MCNC: 246 UG/DL (ref 250–450)
VENTRICULAR RATE, ECG03: 97 BPM
VIT B12 SERPL-MCNC: 366 PG/ML (ref 211–911)
WBC # BLD AUTO: 8.7 K/UL (ref 4.1–11.1)

## 2018-03-21 PROCEDURE — 82607 VITAMIN B-12: CPT | Performed by: INTERNAL MEDICINE

## 2018-03-21 PROCEDURE — 85025 COMPLETE CBC W/AUTO DIFF WBC: CPT | Performed by: INTERNAL MEDICINE

## 2018-03-21 PROCEDURE — 93005 ELECTROCARDIOGRAM TRACING: CPT

## 2018-03-21 PROCEDURE — 82728 ASSAY OF FERRITIN: CPT | Performed by: INTERNAL MEDICINE

## 2018-03-21 PROCEDURE — 97535 SELF CARE MNGMENT TRAINING: CPT

## 2018-03-21 PROCEDURE — 83540 ASSAY OF IRON: CPT | Performed by: INTERNAL MEDICINE

## 2018-03-21 PROCEDURE — 36415 COLL VENOUS BLD VENIPUNCTURE: CPT | Performed by: ORTHOPAEDIC SURGERY

## 2018-03-21 PROCEDURE — 74011250636 HC RX REV CODE- 250/636: Performed by: ORTHOPAEDIC SURGERY

## 2018-03-21 PROCEDURE — 80048 BASIC METABOLIC PNL TOTAL CA: CPT | Performed by: ORTHOPAEDIC SURGERY

## 2018-03-21 PROCEDURE — 85018 HEMOGLOBIN: CPT | Performed by: ORTHOPAEDIC SURGERY

## 2018-03-21 PROCEDURE — 83010 ASSAY OF HAPTOGLOBIN QUANT: CPT | Performed by: INTERNAL MEDICINE

## 2018-03-21 PROCEDURE — G8987 SELF CARE CURRENT STATUS: HCPCS

## 2018-03-21 PROCEDURE — 74011250637 HC RX REV CODE- 250/637: Performed by: ORTHOPAEDIC SURGERY

## 2018-03-21 PROCEDURE — 85730 THROMBOPLASTIN TIME PARTIAL: CPT | Performed by: INTERNAL MEDICINE

## 2018-03-21 PROCEDURE — 97161 PT EVAL LOW COMPLEX 20 MIN: CPT

## 2018-03-21 PROCEDURE — 97165 OT EVAL LOW COMPLEX 30 MIN: CPT

## 2018-03-21 PROCEDURE — 74011250636 HC RX REV CODE- 250/636: Performed by: INTERNAL MEDICINE

## 2018-03-21 PROCEDURE — 71275 CT ANGIOGRAPHY CHEST: CPT

## 2018-03-21 PROCEDURE — 74011636320 HC RX REV CODE- 636/320: Performed by: RADIOLOGY

## 2018-03-21 PROCEDURE — 82746 ASSAY OF FOLIC ACID SERUM: CPT | Performed by: INTERNAL MEDICINE

## 2018-03-21 PROCEDURE — 93970 EXTREMITY STUDY: CPT

## 2018-03-21 PROCEDURE — 97116 GAIT TRAINING THERAPY: CPT

## 2018-03-21 PROCEDURE — 65610000006 HC RM INTENSIVE CARE

## 2018-03-21 PROCEDURE — G8988 SELF CARE GOAL STATUS: HCPCS

## 2018-03-21 PROCEDURE — 81241 F5 GENE: CPT | Performed by: INTERNAL MEDICINE

## 2018-03-21 RX ORDER — HEPARIN SODIUM 5000 [USP'U]/ML
80 INJECTION, SOLUTION INTRAVENOUS; SUBCUTANEOUS ONCE
Status: ACTIVE | OUTPATIENT
Start: 2018-03-21 | End: 2018-03-22

## 2018-03-21 RX ORDER — HEPARIN SODIUM 10000 [USP'U]/100ML
15.5-36 INJECTION, SOLUTION INTRAVENOUS
Status: DISPENSED | OUTPATIENT
Start: 2018-03-21 | End: 2018-03-23

## 2018-03-21 RX ADMIN — Medication 10 ML: at 21:38

## 2018-03-21 RX ADMIN — Medication 2 G: at 05:36

## 2018-03-21 RX ADMIN — OXYCODONE HYDROCHLORIDE 5 MG: 5 TABLET ORAL at 22:48

## 2018-03-21 RX ADMIN — FAMOTIDINE 20 MG: 20 TABLET, FILM COATED ORAL at 20:06

## 2018-03-21 RX ADMIN — STANDARDIZED SENNA CONCENTRATE AND DOCUSATE SODIUM 1 TABLET: 8.6; 5 TABLET, FILM COATED ORAL at 08:49

## 2018-03-21 RX ADMIN — Medication 2 G: at 13:48

## 2018-03-21 RX ADMIN — STANDARDIZED SENNA CONCENTRATE AND DOCUSATE SODIUM 1 TABLET: 8.6; 5 TABLET, FILM COATED ORAL at 20:06

## 2018-03-21 RX ADMIN — Medication: at 11:56

## 2018-03-21 RX ADMIN — FAMOTIDINE 20 MG: 20 TABLET, FILM COATED ORAL at 08:49

## 2018-03-21 RX ADMIN — GABAPENTIN 300 MG: 300 CAPSULE ORAL at 08:49

## 2018-03-21 RX ADMIN — SODIUM CHLORIDE 125 ML/HR: 900 INJECTION, SOLUTION INTRAVENOUS at 13:48

## 2018-03-21 RX ADMIN — Medication 10 ML: at 05:35

## 2018-03-21 RX ADMIN — ACETAMINOPHEN 650 MG: 325 TABLET ORAL at 08:49

## 2018-03-21 RX ADMIN — IOPAMIDOL 75 ML: 755 INJECTION, SOLUTION INTRAVENOUS at 20:00

## 2018-03-21 RX ADMIN — POLYETHYLENE GLYCOL 3350 17 G: 17 POWDER, FOR SOLUTION ORAL at 08:49

## 2018-03-21 RX ADMIN — HEPARIN SODIUM 15.5 UNITS/KG/HR: 10000 INJECTION, SOLUTION INTRAVENOUS at 19:04

## 2018-03-21 RX ADMIN — ALLOPURINOL 200 MG: 100 TABLET ORAL at 21:37

## 2018-03-21 RX ADMIN — ATORVASTATIN CALCIUM 20 MG: 20 TABLET, FILM COATED ORAL at 21:37

## 2018-03-21 RX ADMIN — Medication 10 ML: at 13:49

## 2018-03-21 RX ADMIN — GABAPENTIN 300 MG: 300 CAPSULE ORAL at 20:06

## 2018-03-21 NOTE — PROGRESS NOTES
Problem: Mobility Impaired (Adult and Pediatric)  Goal: *Acute Goals and Plan of Care (Insert Text)  Physical Therapy Goals  Initiated 3/21/2018    1. Patient will move from supine to sit and sit to supine  in bed with independence within 4 days. 2. Patient will perform sit to stand with modified independence within 4 days. 3. Patient will ambulate with modified independence for 100 feet with the least restrictive device within 4 days. 4. Patient will ascend/descend 4 stairs with both handrail(s) with modified independence within 4 days. 5. Patient will verbalize and demonstrate understanding of spinal precautions (No bending, lifting greater than 5 lbs, or twisting; log-roll technique; frequent repositioning as instructed) within 4 days. physical Therapy EVALUATION  Patient: Preeti Wilson (41 y.o. male)  Date: 3/21/2018  Primary Diagnosis: Lumbar adjacent segment disease with spondylolisthesis [M51.36, M43.16]  Lumbar stenosis with neurogenic claudication [M48.062]  Procedure(s) (LRB):  L4-S1 LAMINECTOMY, FUSION, INSTRUMENTATION, TLIF, BONE GRAFT (N/A) 1 Day Post-Op   Precautions:   Back, Fall    ASSESSMENT :  Based on the objective data described below, the patient presents with s/p L4-S1 laminectomy/fusion. Patient has a past medical history of gout, hyperlipidemia, and knee arthroscopy. Patient re-educated on back precautions and is able to verbalize understanding. Patient is contact guard assist to min A x1 for all mobility. Patient demonstrates carry over from occupational therapy session and is able to perform the proper sequence for log roll. Patients heart rate starts at 100 bpm with standing and increases to 127 bpm with ambulation and requires a rest break in order to stabilize heart rate. Patient's nurse is notified with increased heart rate with activity. Patient would benefit from continued physical therapy to verbalize back precautions, ambulate and practice ascending/descending stairs. Patient will benefit from skilled intervention to address the above impairments. Patients rehabilitation potential is considered to be Good  Factors which may influence rehabilitation potential include:   []         None noted  []         Mental ability/status  [x]         Medical condition  []         Home/family situation and support systems  [x]         Safety awareness  [x]         Pain tolerance/management  []         Other:      PLAN :  Recommendations and Planned Interventions:  [x]           Bed Mobility Training             []    Neuromuscular Re-Education  [x]           Transfer Training                   []    Orthotic/Prosthetic Training  [x]           Gait Training                         []    Modalities  [x]           Therapeutic Exercises           []    Edema Management/Control  [x]           Therapeutic Activities            [x]    Patient and Family Training/Education  []           Other (comment):    Frequency/Duration: Patient will be followed by physical therapy  5 times a week to address goals. Discharge Recommendations: Home Health  Further Equipment Recommendations for Discharge: Patient states that his son has a rolling walker     SUBJECTIVE:   Patient stated: Patient is able to verbalize back precautions with minimal cuing.      OBJECTIVE DATA SUMMARY:   HISTORY:    Past Medical History:   Diagnosis Date    Gout     Hyperlipidemia      Past Surgical History:   Procedure Laterality Date    HX COLONOSCOPY N/A     HX KNEE ARTHROSCOPY Bilateral 2008,2011    torn meniscus    HX ORTHOPAEDIC Right 2011    remove bone spur on elbow     Prior Level of Function/Home Situation: Independent  Personal factors and/or comorbidities impacting plan of care: see above    Home Situation  Home Environment: Private residence  # Steps to Enter: 4  Rails to Enter: Yes  Hand Rails : Bilateral  One/Two Story Residence: One story  Living Alone: No  Support Systems: Spouse/Significant Other/Partner (One child)  Patient Expects to be Discharged to[de-identified] Private residence  Current DME Used/Available at Home: [employer], straight    EXAMINATION/PRESENTATION/DECISION MAKING:   Vitals:    03/21/18 1112 03/21/18 1120 03/21/18 1150 03/21/18 1330   BP: 103/60 98/71 103/71    BP 1 Location: Right arm Right arm Right arm    BP Patient Position: At rest Post activity; Sitting Post activity    Pulse: 89 (!) 128 (!) 101    Resp: 16      Temp: 96.9 °F (36.1 °C)      SpO2: 94% 90% 94% 93%   Weight:         Critical Behavior:  Neurologic State: Alert  Orientation Level: Oriented X4  Cognition: Appropriate decision making, Appropriate for age attention/concentration, Appropriate safety awareness, Follows commands  Safety/Judgement: Awareness of environment, Insight into deficits    Range Of Motion:  AROM: Within functional limits           PROM: Within functional limits           Strength:    Strength: Within functional limits                    Tone & Sensation:   Tone: Normal                              Coordination:  Coordination: Within functional limits  Vision:   Acuity: Within Defined Limits;Able to read employee name badge without difficulty  Corrective Lenses: Reading glasses  Functional Mobility:  Bed Mobility:     Supine to Sit: Minimum assistance; Additional time        Transfers:  Sit to Stand: Contact guard assistance  Stand to Sit: Contact guard assistance        Bed to Chair: Minimum assistance; Additional time;Assist x1              Balance:   Sitting: Intact  Standing: Intact  Ambulation/Gait Training:  Distance (ft): 50 Feet (ft)  Assistive Device: Walker, rolling;Gait belt  Ambulation - Level of Assistance: Contact guard assistance          Patient's heart rate must be monitored with gait due to elevated heart rate with activity.                                                Functional Measure:  Tinetti test:    Sitting Balance: 1  Arises: 1  Attempts to Rise: 2  Immediate Standing Balance: 1  Standing Balance: 1  Nudged: 2  Eyes Closed: 1  Turn 360 Degrees - Continuous/Discontinuous: 1  Turn 360 Degrees - Steady/Unsteady: 1  Sitting Down: 1  Balance Score: 12  Indication of Gait: 1  R Step Length/Height: 1  L Step Length/Height: 1  R Foot Clearance: 1  L Foot Clearance: 1  Step Symmetry: 1  Step Continuity: 1  Path: 1  Trunk: 2  Walking Time: 1  Gait Score: 11  Total Score: 23       Tinetti Test and G-code impairment scale:  Percentage of Impairment CH    0%   CI    1-19% CJ    20-39% CK    40-59% CL    60-79% CM    80-99% CN     100%   Tinetti  Score 0-28 28 23-27 17-22 12-16 6-11 1-5 0       Tinetti Tool Score Risk of Falls  <19 = High Fall Risk  19-24 = Moderate Fall Risk  25-28 = Low Fall Risk  Tinetti ME. Performance-Oriented Assessment of Mobility Problems in Elderly Patients. [place] 66; [id number]. (Scoring Description: PT Bulletin Feb. 10, 1993)    Older adults: Steven Hernandez et al, 2009; n = 1000 [facility] elderly evaluated with ABC, AMINA, ADL, and IADL)  · Mean AMINA score for males aged 69-68 years = 26.21(3.40)  · Mean AMINA score for females age 69-68 years = 25.16(4.30)  · Mean AMINA score for males over 80 years = 23.29(6.02)  · Mean AMINA score for females over 80 years = 17.20(8.32)       G codes: In compliance with CMSs Claims Based Outcome Reporting, the following G-code set was chosen for this patient based on their primary functional limitation being treated: The outcome measure chosen to determine the severity of the functional limitation was the Tinetti with a score of 23/28 which was correlated with the impairment scale.     ? Mobility - Walking and Moving Around:     - CURRENT STATUS: CI - 1%-19% impaired, limited or restricted    - GOAL STATUS: CH - 0% impaired, limited or restricted    - D/C STATUS:  ---------------To be determined---------------      Physical Therapy Evaluation Charge Determination   History Examination Presentation Decision-Making   MEDIUM  Complexity : 1-2 comorbidities / personal factors will impact the outcome/ POC  LOW Complexity : 1-2 Standardized tests and measures addressing body structure, function, activity limitation and / or participation in recreation  LOW Complexity : Stable, uncomplicated  Other outcome measures Tinetti  LOW       Based on the above components, the patient evaluation is determined to be of the following complexity level: LOW     Pain:  Pain Scale 1: Numeric (0 - 10)  Pain Intensity 1: 2  Pain Location 1: Back  Pain Orientation 1: Posterior  Pain Description 1: Sore     Activity Tolerance:   Good- patient's heart rate increases with activity   Please refer to the flowsheet for vital signs taken during this treatment. After treatment:   [x]         Patient left in no apparent distress sitting up in chair  []         Patient left in no apparent distress in bed  [x]         Call bell left within reach  [x]         Nursing notified  []         Caregiver present  []         Bed alarm activated    COMMUNICATION/EDUCATION:   The patients plan of care was discussed with: Registered Nurse. [x]         Fall prevention education was provided and the patient/caregiver indicated understanding. [x]         Patient/family have participated as able in goal setting and plan of care. [x]         Patient/family agree to work toward stated goals and plan of care. []         Patient understands intent and goals of therapy, but is neutral about his/her participation. []         Patient is unable to participate in goal setting and plan of care. Thank you for this referral.  Kavita Ty   Time Calculation: 20 mins     Regarding student involvement in patient care:  A student participated in this treatment session. Per CMS Medicare statements and APTA guidelines I certify that the following was true:  1. I was present and directly observed the entire session. 2. I made all skilled judgments and clinical decisions regarding care.   3. I am the practitioner responsible for assessment, treatment, and documentation.

## 2018-03-21 NOTE — PROGRESS NOTES
Spoke w/ Floor RN Silvina Aldana and Charge RN Aide Kitchen. Pt's chest CTA was + for multiple pulmonary emboli including saddle embolus at the main pulmonary artery bifurcation. STAT Hospitalist consult placed. Since patient is POD #1 from lumbar fusion surgery, we prefer treatment with IV Heparin and rate adjustments (no bolus) due to risk of epidural hematoma- unless absolutely necessary. Hemovac drain to remain in place until specified by Ortho MD/PA.      Dinora Jarrell, [address] [facility]  Orthopaedic Surgery  Physician Assistant to Dr. Shila Charles

## 2018-03-21 NOTE — PROGRESS NOTES
1830TRANSFER - IN REPORT:    Verbal report received from Marco on Genice Robert  being received from 4th Floor(unit) for routine progression of care      Report consisted of patients Situation, Background, Assessment and   Recommendations(SBAR). Information from the following report(s) SBAR was reviewed with the receiving nurse. Opportunity for questions and clarification was provided. Assessment completed upon patients arrival to unit and care assumed. [facility] [place] [facility]   1900: Bedside SBAR report given to Grady. Labs and plan of care reviewed at this time. Call light within reach, bed exit alarm on. Will continue to monitor.    1905: Initiated Heparin gtt

## 2018-03-21 NOTE — PROGRESS NOTES
STAT hospitalist consult called to Dr. Louis Schirmer. Relayed patient's surgical status and CTA report demonstrating CTA bilateral PEs including saddle embolus. Per Oma Schultz no heparin bolus preferred.   If absolutely necessary for patient surgical, hospitalist to contact Oma Schultz to discuss     Hemovac drain to stay in place until MD orders removal.

## 2018-03-21 NOTE — PROGRESS NOTES
Problem: Self Care Deficits Care Plan (Adult)  Goal: *Acute Goals and Plan of Care (Insert Text)  Occupational Therapy Goals  Initiated 3/21/2018    1. Patient will perform LB dressing with modified independence using Reacher PRN within 7 days. 2.  Patient will perform toilet transfer with modified independence using most appropriate DME within 7 days. 3.  Patient will toileting at modified independence within 7 days. 4.  Patient will don/doff back brace at independence within 7 days. 5.  Patient will verbalize/demonstrate 3/3 back precautions during ADL tasks without cues within 7 days. Occupational Therapy EVALUATION  Patient: Dillon Albarran (46 y.o. male)  Date: 3/21/2018  Primary Diagnosis: Lumbar adjacent segment disease with spondylolisthesis [M51.36, M43.16]  Lumbar stenosis with neurogenic claudication [M48.062]  Procedure(s) (LRB):  L4-S1 LAMINECTOMY, FUSION, INSTRUMENTATION, TLIF, BONE GRAFT (N/A) 1 Day Post-Op   Precautions:   Back, Fall    ASSESSMENT :  Based on the objective data described below, the patient presents with decreased activity tolerance and minimal but controlled pain, POD1 L4-S1 laminectomy/fusion post 4 weeks of immobility due to pain, currently min A for bed mobility and SPT from bed to chair with min A, good balance noted. Patient currently max A for LB ADLs unable to tailor sit EOB, min A to I for UB ADLs including quick draw brace (worn for a few weeks prior to surgery). Educated on back precautions with bed mobility, ADLs and functional mobility. Patient Vitals stable with 1 notable increase in HR post activity in 180s, double checked, but not sustained, 100s to 180s then back to 100s at rest within 1-2 minutes, RN alerted, BP stable with activity and up in chair. Patient normally independent, working (requires 50lb lifting), driving and enjoys duck hunting prior to 4 weeks ago. Recommend HH at discharge pending progression.  And toileting/transfer with LB AE education next session. Patient will benefit from skilled intervention to address the above impairments. Patients rehabilitation potential is considered to be Excellent  Factors which may influence rehabilitation potential include:   [x]             None noted  []             Mental ability/status  []             Medical condition  []             Home/family situation and support systems  []             Safety awareness  []             Pain tolerance/management  []             Other:      PLAN :  Recommendations and Planned Interventions:  [x]               Self Care Training                  [x]        Therapeutic Activities  [x]               Functional Mobility Training    []        Cognitive Retraining  [x]               Therapeutic Exercises           [x]        Endurance Activities  [x]               Balance Training                   []        Neuromuscular Re-Education  []               Visual/Perceptual Training     [x]   Home Safety Training  [x]               Patient Education                 []        Family Training/Education  []               Other (comment):    Frequency/Duration: Patient will be followed by occupational therapy 5 times a week to address goals. Discharge Recommendations: Home Health  Further Equipment Recommendations for Discharge: AE hip kit     SUBJECTIVE:   Patient stated I might need a reacher. 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), required supine positioning, active duck elvin  Occupations in which the patient is/was successful, what are the barriers preventing that success:   Performance Patterns (routines, roles, habits, and rituals):   Personal Interests and/or values:   Expanded or extensive additional review of patient history:     Home Situation  Home Environment: Private residence  # Steps to Enter: 4  Rails to Enter: Yes  Hand Rails : Bilateral  One/Two Story Residence: One story  Living Alone: No  Support Systems: Family member(s)  Patient Expects to be Discharged to[de-identified] Private residence  Current DME Used/Available at Home: [address], rolling, [address] [facility] chair, Commode, bedside, Leonor Mishra, straight, Adaptive dressing aides (long handled sponge)  [x]  Right hand dominant   []  Left hand dominant    EXAMINATION OF PERFORMANCE DEFICITS:  Cognitive/Behavioral Status:  Neurologic State: Alert  Orientation Level: Oriented X4  Cognition: Appropriate decision making; Appropriate for age attention/concentration; Appropriate safety awareness; Follows commands  Perception: Appears intact  Perseveration: No perseveration noted  Safety/Judgement: Awareness of environment; Insight into deficits    Skin: intact, wound intact,    Edema: none noted    Hearing: intact       Vision/Perceptual:                    Acuity: Within Defined Limits;Able to read employee name badge without difficulty    Corrective Lenses: Reading glasses    Range of Motion:  B UE  AROM: Within functional limits                         Strength:  B UE, not formally tested, intact functionally  Strength: Within functional limits                Coordination:  Coordination: Within functional limits  Fine Motor Skills-Upper: Left Intact; Right Intact         Tone & Sensation:  B UE  Tone: Normal                         Balance:  Sitting: Intact  Standing: Intact; With support    Functional Mobility and Transfers for ADLs:  Bed Mobility:  Supine to Sit: Minimum assistance; Additional time;Assist x1    Transfers:  Sit to Stand: Minimum assistance;Assist x1;Additional time  Stand to Sit: Minimum assistance; Additional time;Assist x1  Bed to Chair: Minimum assistance; Additional time;Assist x1  Toilet Transfer : Contact guard assistance; Additional time;Assist x1 (BSC)    ADL Assessment:  Feeding: Independent    Oral Facial Hygiene/Grooming: Setup    Bathing: Moderate assistance    Upper Body Dressing: Minimum assistance (lines/leads, quick draw brace)    Lower Body Dressing: Maximum assistance (able to don at knees only , needs AE edu)    Toileting: Moderate assistance (for clothing mangement and hygiene)                ADL Intervention and task modifications:          Completed OT evaluation and ADLs seated EOB and standing as able with no assist for balance. Educated on safety and endurance training with encouragement for full participation in ADLs while in hospital. Good understanding noted. Patient instructed and indicated understanding the benefits of maintaining activity tolerance, functional mobility, and independence with self care tasks during acute stay  to ensure safe return home and to baseline. Encouraged patient to increase frequency and duration OOB, not sitting longer than 30 mins without marching/walking with staff, be out of bed for all meals, perform daily ADLs (as approved by RN/MD regarding bathing etc), and performing functional mobility to/from bathroom. Patient instruction and indicated understanding on body mechanics, ergonomics and gravitational force on the spine during different body positions to plan activities in prep for return home to complete basic ADLs, instrumental ADLs and back to work safely. Bathing: Patient instructed and indicated understanding when bathing to not submerge wound in water, stand to shower or sponge bathe, cover wound with plastic and tape to ensure no water reaches bandage/wound without cues. Dressing brace: Patient instructed and demonstrated to don/doff velcro on brace using dominant side, keeping non-dominant side intact.  Patient instructed and demonstrated in meantime of being able to stand with back against wall to don/doff brace, to don/doff seated using lap and bed/chair surface to support brace while manipulating. Dressing lower body: Patient instructed to don brace first and on the benefits to remain seated to don all clothing to increase independence with precautions and pain management. Patient instructed and demonstrated tailor sitting for lower body dressing with max A. Toileting: Patient instructed on the benefits of using flushable wet wipes and toilet tongs if decreased reach or pain for lul care. Also, the benefits of a reacher to aid in clothing management. Patient instruction and indicated understanding to not strain i.e. holding breath to bear down during a bowel movement, lifting/activity,                         Cognitive Retraining  Safety/Judgement: Awareness of environment; Insight into deficits    Therapeutic Exercise:  encouraged OOB and full participation with ADLs to improve strength and endurance. Functional Measure:  Barthel Index:    Bathin  Bladder: 10  Bowels: 10  Groomin  Dressin  Feeding: 10  Mobility: 0  Stairs: 0  Toilet Use: 5  Transfer (Bed to Chair and Back): 5  Total: 50       Barthel and G-code impairment scale:  Percentage of impairment CH  0% CI  1-19% CJ  20-39% CK  40-59% CL  60-79% CM  80-99% CN  100%   Barthel Score 0-100 100 99-80 79-60 59-40 20-39 1-19   0   Barthel Score 0-20 20 17-19 13-16 9-12 5-8 1-4 0      The Barthel ADL Index: Guidelines  1. The index should be used as a record of what a patient does, not as a record of what a patient could do. 2. The main aim is to establish degree of independence from any help, physical or verbal, however minor and for whatever reason. 3. The need for supervision renders the patient not independent. 4. A patient's performance should be established using the best available evidence.  Asking the patient, friends/relatives and nurses are the usual sources, but direct observation and common sense are also important. However direct testing is not needed. 5. Usually the patient's performance over the preceding 24-48 hours is important, but occasionally longer periods will be relevant. 6. Middle categories imply that the patient supplies over 50 per cent of the effort. 7. Use of aids to be independent is allowed. Erma Ricci., Barthel, D.W. ([id number]). Functional evaluation: the Barthel Index. [address] (14)2. Daleen Fend josafat [place], J.J.M.F, Junaid Chinchilla., Mac Brandt., [place], [facility] (1999). Measuring the change indisability after inpatient rehabilitation; comparison of the responsiveness of the Barthel Index and Functional Max Measure. Journal of Neurology, Neurosurgery, and Psychiatry, 66(4), 778-805. ANJANA AguilarJVIRGEN, RODOLFO Betancourt, & Sukumar Burgos M.A. (2004.) Assessment of post-stroke quality of life in cost-effectiveness studies: The usefulness of the Barthel Index and the EuroQoL-5D. Quality of Life Research, 13, 120-93         G codes: In compliance with CMSs Claims Based Outcome Reporting, the following G-code set was chosen for this patient based on their primary functional limitation being treated: The outcome measure chosen to determine the severity of the functional limitation was the barthel index with a score of 50/100 which was correlated with the impairment scale. ?  Self Care:     - CURRENT STATUS: CK - 40%-59% impaired, limited or restricted    - GOAL STATUS: CH - 0% impaired, limited or restricted    - D/C STATUS:  ---------------To be determined---------------     Occupational Therapy Evaluation Charge Determination   History Examination Decision-Making   LOW Complexity : Brief history review  MEDIUM Complexity : 3-5 performance deficits relating to physical, cognitive , or psychosocial skils that result in activity limitations and / or participation restrictions LOW Complexity : No comorbidities that affect functional and no verbal or physical assistance needed to complete eval tasks       Based on the above components, the patient evaluation is determined to be of the following complexity level: LOW   Pain:  Pain Scale 1: Numeric (0 - 10)  Pain Intensity 1: 2  Pain Location 1: Back  Pain Orientation 1: Posterior  Pain Description 1: Sore     Activity Tolerance:   Good, vitals stable, did not require PCA  Please refer to the flowsheet for vital signs taken during this treatment. After treatment:   [x] Patient left in no apparent distress sitting up in chair  [] Patient left in no apparent distress in bed  [x] Call bell left within reach  [x] Nursing notified  [] Caregiver present  [] Bed alarm activated    COMMUNICATION/EDUCATION:   The patients plan of care was discussed with: Registered Nurse. [x] Home safety education was provided and the patient/caregiver indicated understanding. [x] Patient/family have participated as able in goal setting and plan of care. [x] Patient/family agree to work toward stated goals and plan of care. [] Patient understands intent and goals of therapy, but is neutral about his/her participation. [] Patient is unable to participate in goal setting and plan of care. This patients plan of care is appropriate for delegation to [facility] [place].     Thank you for this referral.  Tequila Green, OT  Time Calculation: 39 mins

## 2018-03-21 NOTE — ANESTHESIA POSTPROCEDURE EVALUATION
Post-Anesthesia Evaluation and Assessment    Patient: Preeti Wilson MRN: [id number]  SSN: [id number]    YOB: 1955  Age: 58 y.o. Sex: male       Cardiovascular Function/Vital Signs  Visit Vitals    /69    Pulse (!) 104    Temp 36.7 °C (98.1 °F)    Resp 13    Wt 97 kg (213 lb 13.5 oz)    SpO2 96%    BMI 28.21 kg/m2       Patient is status post general anesthesia for Procedure(s):  L4-S1 LAMINECTOMY, FUSION, INSTRUMENTATION, TLIF, BONE GRAFT. Nausea/Vomiting: None    Postoperative hydration reviewed and adequate. Pain:  Pain Scale 1: Visual (03/20/18 1947)  Pain Intensity 1: 0 (03/20/18 1947)   Managed    Neurological Status:   Neuro (WDL): Exceptions to [facility] (03/20/18 1947)  Neuro  Neurologic State: Drowsy; Eyes open to stimulus; Pharmacologically induced (comment); Sleeping (03/20/18 1947)  Orientation Level: Disoriented X4 (03/20/18 1947)  Cognition: No command following (03/20/18 1947)  Speech: Other (comment) (03/20/18 1947)  LUE Motor Response: Nonpurposeful (03/20/18 1947)  LLE Motor Response: Nonpurposeful (03/20/18 1947)  RUE Motor Response: Nonpurposeful (03/20/18 1947)  RLE Motor Response: Nonpurposeful (03/20/18 1947)   At baseline    Mental Status and Level of Consciousness: Arousable    Pulmonary Status:   O2 Device: Nasal cannula (03/20/18 1949)   Adequate oxygenation and airway patent    Complications related to anesthesia: None    Post-anesthesia assessment completed.  No concerns    Signed By: Shan Beasley MD     March 20, 2018

## 2018-03-21 NOTE — PERIOP NOTES
TRANSFER - OUT REPORT:    Verbal report given to RN Glorine Seats  being transferred to [facility] routine post - op       Report consisted of patients Situation, Background, Assessment and   Recommendations(SBAR). Information from the following report(s) SBAR, OR Summary, Procedure Summary, Intake/Output and MAR was reviewed with the receiving nurse. Lines:   Peripheral IV 03/20/18 Left Forearm (Active)   Site Assessment Clean, dry, & intact 3/20/2018  7:47 PM   Phlebitis Assessment 0 3/20/2018  7:47 PM   Infiltration Assessment 0 3/20/2018  7:47 PM   Dressing Status Clean, dry, & intact; Occlusive 3/20/2018  7:47 PM   Dressing Type Tape;Transparent 3/20/2018  7:47 PM   Hub Color/Line Status Pink; Infusing 3/20/2018  7:47 PM   Alcohol Cap Used Yes 3/20/2018  2:18 PM        Opportunity for questions and clarification was provided.       Patient transported with:   O2 @ 2 liters  Registered Nurse

## 2018-03-21 NOTE — ROUTINE PROCESS
Primary Nurse Ara George RN and Shila Escalera RN performed a dual skin assessment on this patient No impairment noted aside from surgical incision.   Brain score is 21

## 2018-03-21 NOTE — PROGRESS NOTES
Bedside and Verbal shift change report given to Rukhsana Clements RN (oncoming nurse) by Song Gandhi RN (offgoing nurse). Report included the following information SBAR, Kardex, Procedure Summary, Intake/Output and MAR. Patient , notified Song Gandhi NP. EKG done.   CTA done Emergency call Death PE STAT call to ETIENNE Buckley  Per ETIENNE Buckley to not bolus patient with Heparin unless life threating to call [phone] and to not pull DOMO drain

## 2018-03-21 NOTE — PROGRESS NOTES
3/21/2018  3:01 PM  CM met with pt for assessment. Demographics and PCP were confirmed. Pt is a 58year old,  male who lives in a private residence with his wife (4 exterior steps, 0 interior steps). PTA, pt was able to complete ADLs with the occasional use of a cane. Pt has prescription drug coverage, and prefers [facility] in [address]. [facility] on 3/22/18. CM will continue to monitor for finalized discharge recommendations. Pt's wife will provide transport upon discharge. Estefania Gutierrez MA    Care Management Interventions  PCP Verified by CM: Yes [facility])  Mode of Transport at Discharge:  Other (see comment) (wife)  Transition of Care Consult (CM Consult): [address]: No  Reason Outside [place]: Out of service area, Patient already serviced by other home care/hospice agency  MyChart Signup: No  Discharge Durable Medical Equipment: No  Physical Therapy Consult: Yes  Occupational Therapy Consult: Yes  Current Support Network: Lives with Spouse  Confirm Follow Up Transport: Family  Plan discussed with Pt/Family/Caregiver: Yes  Freedom of Choice Offered: Yes  Discharge Location  Discharge Placement: Home with home health

## 2018-03-21 NOTE — PROGRESS NOTES
ORTHOPAEDIC LUMBAR FUSION PROGRESS NOTE    NAME:     Sharif Lowery   :       1955   MRN:       [id number]   DATE:      3/21/2018    POD:    1 Day Post-Op  S/P:    Procedure(s):  L4-S1 LAMINECTOMY, FUSION, INSTRUMENTATION, TLIF, BONE GRAFT    SUBJECTIVE:    C/O back pain along surgical incision  No leg pain or numbness  Denies nausea/vomiting, chest pain, headache or shortness of breath  Pain controlled    Recent Labs      18   0334   HGB  11.2*   NA  140   K  4.8   CL  106   CO2  30   BUN  12   CREA  0.91   GLU  143*     Patient Vitals for the past 12 hrs:   BP Temp Pulse Resp SpO2 Weight   18 0344 114/79 97.7 °F (36.5 °C) 93 14 94 % -   18 0052 100/61 98.5 °F (36.9 °C) 86 16 95 % -   18 0002 118/73 98.3 °F (36.8 °C) 88 14 93 % -   18 2235 115/71 97.7 °F (36.5 °C) 86 14 93 % -   18 2135 112/72 97.8 °F (36.6 °C) (!) 103 16 98 % -   18 99/71 - 99 (!) 1 96 % -   18 112/65 - (!) 102 23 97 % -   18 104/78 - (!) 104 13 98 % -   18 109/76 - (!) 104 10 92 % -   18 108/77 - (!) 102 10 96 % -   18 125/76 - (!) 109 12 98 % -   18 114/75 - (!) 102 13 96 % -   18 116/77 - (!) 103 23 99 % -   18 117/80 - 100 11 98 % -   18 116/79 98.2 °F (36.8 °C) 100 10 98 % -   18 116/79 - (!) 103 16 98 % -   18 115/76 - (!) 102 11 97 % -   03/20/18 2025 117/78 - (!) 110 14 97 % -   18 116/69 - (!) 104 13 96 % 97 kg (213 lb 13.5 oz)   18 121/76 - (!) 109 12 (!) 89 % -   18 116/56 - (!) 102 11 95 % -   18 113/65 - (!) 102 12 91 % -   18 110/69 - (!) 104 11 96 % -   18 108/63 - (!) 104 10 94 % -       EXAM:  Dressings clean, dry and intact   Positive strength/ROM bilat lower ext.   Neuro intact to sensation  Calves, soft & nontender  BL LEs NVID      PLAN:  D/C PCA, change to PO pain medications  PT/OT, OOB w/ assist  Advance regular diet      Del Rizzo [place]  Orthopaedic Surgery  Physician Assistant to Dr. Beata Ivory

## 2018-03-21 NOTE — CONSULTS
[facility]  [address] [facility], [facility]  [phone]    Hospitalist Consult Note      NAME:  Duncan Dobbs   :   1955   MRN:  [id number]     Requesting Physician Manuel Dela Cruz MD   Reason for Consult:  PE     PCP:  Gela Hall MD     Date/Time:  3/21/2018 6:13 PM       Assessment and Plan:      Lumbar stenosis with neurogenic claudication - Tolerated surgery. Ortho will be attending to pain control, DVT prophylaxis and rehab decisions as usual.  Discussed with ortho, and they clear patient to start on heparin drip. They requested no initial bolus. Saddle embolus of pulmonary artery - POA, likely due to large DVT that formed during his period of debility. He now mentions his sister has [address], but he refused to be tested. Check LE US for size of any residual DVT. Check ECHO for heart strain. He appears stable at this point, but move to ICU for closer monitoring. Anemia - Mild. Check serologies and hemoccult for any evidence of chronic blood loss. Hyperlipidemia - Continue atrovastatin    Gout - continue allopurinol. Subjective:     CHIEF COMPLAINT: back pain     HISTORY OF PRESENT ILLNESS:     Mr. Gauri Espinosa is a 58 y.o.  male who is admitted to the Ortho Service with back pain, who yesterday underwent an extensive lumbar procedure. We are asked to evaluate for saddle embolism noted on CT. Patient tolerated surgery without complications. Felt well except for pain, prior to surgery. He denies any dyspnea, CP or leg swelling prior to surgery, but now mentions his sister has clots due to [address]. Takes medications consistently at home. Pre-op labs were unremarkable. We will follow along.     Past Medical History:   Diagnosis Date    Gout     Hyperlipidemia         Past Surgical History:   Procedure Laterality Date    HX COLONOSCOPY N/A     HX KNEE ARTHROSCOPY Bilateral ,    torn meniscus    HX ORTHOPAEDIC Right 2011    remove bone spur on elbow       Social History   Substance Use Topics    Smoking status: Never Smoker    Smokeless tobacco: Never Used    Alcohol use 1.2 oz/week     2 Cans of beer per week      Comment: does not drink every week        Family History   Problem Relation Age of Onset    Cancer Mother      lung    Heart Failure Father     Heart Attack Father      x2    Hypertension Father     Diabetes Sister      Factor 11 leiden    Other Sister      diverticulitis        No Known Allergies     Prior to Admission medications    Medication Sig Start Date End Date Taking? Authorizing Provider   oxyCODONE-acetaminophen (PERCOCET) 5-325 mg per tablet Take 1 Tab by mouth every four (4) hours as needed for Pain. Yes Historical Provider   cyclobenzaprine (FLEXERIL) 10 mg tablet Take 1 Tab by mouth three (3) times daily as needed for Muscle Spasm(s). 3/20/18  Yes ETIENNE Recinos   docusate sodium (COLACE) 100 mg capsule Take 1 Cap by mouth two (2) times a day. 3/20/18  Yes ETIENNE Recinos   promethazine (PHENERGAN) 25 mg tablet Take 1 Tab by mouth every six (6) hours as needed for Nausea. 3/20/18  Yes ETIENNE Recinos   oxyCODONE IR (ROXICODONE) 5 mg immediate release tablet Take 1-2 tablets PO every 4 to 6 hours prn post-operative pain 3/20/18  Yes ETIENNE Recinos   atorvastatin (LIPITOR) 20 mg tablet Take 20 mg by mouth nightly. Yes Historical Provider   allopurinol (ZYLOPRIM) 100 mg tablet Take 200 mg by mouth nightly. Yes Historical Provider   gabapentin (NEURONTIN) 300 mg capsule Take 300 mg by mouth two (2) times a day.    Yes Historical Provider         Current Facility-Administered Medications:     iopamidol (ISOVUE-370) 76 % injection 100 mL, 100 mL, IntraVENous, RAD ONCE, Yahir Sanchez MD    heparin (porcine) injection 7,750 Units, 80 Units/kg, IntraVENous, ONCE, Mike Irizarry MD    heparin 25,000 units in D5W 250 ml infusion, 15.5-36 Units/kg/hr, IntraVENous, TITRATE, Chava Herrera MD    gabapentin (NEURONTIN) capsule 300 mg, 300 mg, Oral, BID, Elias Orta MD, 300 mg at 03/21/18 0849    atorvastatin (LIPITOR) tablet 20 mg, 20 mg, Oral, QHS, Elias Orta MD, 20 mg at 03/20/18 2301    allopurinol (ZYLOPRIM) tablet 200 mg, 200 mg, Oral, QHS, Elias Orta MD, 200 mg at 03/20/18 2301    benzocaine-menthol (CEPACOL) lozenge 1 Lozenge, 1 Lozenge, Mucous Membrane, PRN, Elias Orta MD    HYDROmorphone (PF) 15 mg/30 ml (DILAUDID) PCA, , IntraVENous, TITRATE, Elias Orta MD    0.9% sodium chloride infusion, 125 mL/hr, IntraVENous, CONTINUOUS, Elias Orta MD, Last Rate: 125 mL/hr at 03/21/18 1348, 125 mL/hr at 03/21/18 1348    sodium chloride (NS) flush 5-10 mL, 5-10 mL, IntraVENous, Q8H, Elias Orta MD, 10 mL at 03/21/18 1349    sodium chloride (NS) flush 5-10 mL, 5-10 mL, IntraVENous, PRN, Elias Orta MD, 10 mL at 03/20/18 2302    naloxone (NARCAN) injection 0.4 mg, 0.4 mg, IntraVENous, PRN, Elias Orta MD    senna-docusate (PERICOLACE) 8.6-50 mg per tablet 1 Tab, 1 Tab, Oral, BID, Elias Orta MD, 1 Tab at 03/21/18 0849    polyethylene glycol (MIRALAX) packet 17 g, 17 g, Oral, DAILY, Elias Orta MD, 17 g at 03/21/18 0849    [START ON 3/22/2018] bisacodyl (DULCOLAX) suppository 10 mg, 10 mg, Rectal, DAILY PRN, Elias Orta MD    acetaminophen (TYLENOL) tablet 650 mg, 650 mg, Oral, Q6H PRN, Elias Orta MD, 650 mg at 03/21/18 0849    oxyCODONE IR (ROXICODONE) tablet 5 mg, 5 mg, Oral, Q3H PRN, Elias Orta MD    oxyCODONE IR (ROXICODONE) tablet 10 mg, 10 mg, Oral, Q3H PRN, Elias Orta MD    HYDROmorphone (PF) (DILAUDID) injection 0.5 mg, 0.5 mg, IntraVENous, Q3H PRN, Elias Orta MD    ondansetron [facility]) injection 4 mg, 4 mg, IntraVENous, Q6H PRN, Elias Orta MD    famotidine (PEPCID) tablet 20 mg, 20 mg, Oral, BID, Elias Orta MD, 20 mg at 03/21/18 0849    diphenhydrAMINE (BENADRYL) injection 12.5 mg, 12.5 mg, IntraVENous, Q6H PRN, Elias Orta MD  [employer] cyclobenzaprine (FLEXERIL) tablet 10 mg, 10 mg, Oral, TID PRN, Foster Oseguera MD    Review of Systems:  (bold if positive, if negative)    Gen:  Eyes:  ENT:  CVS:  Pulm:  GI:    :    MS:  Pain, weakness, arthritisSkin:  Psych:  Endo:    Hem:  Renal:    Neuro:            Objective:      VITALS:    Vital signs reviewed; most recent are:    Visit Vitals    /66 (BP 1 Location: Right arm, BP Patient Position: At rest)    Pulse 93    Temp 98.3 °F (36.8 °C)    Resp 16    Wt 97 kg (213 lb 13.5 oz)    SpO2 91%    BMI 28.21 kg/m2     SpO2 Readings from Last 6 Encounters:   03/21/18 91%   03/13/18 97%   02/26/18 95%    O2 Flow Rate (L/min): 2 l/min     Intake/Output Summary (Last 24 hours) at 03/21/18 1813  Last data filed at 03/21/18 1348   Gross per 24 hour   Intake             2820 ml   Output             1385 ml   Net             1435 ml      All Micro Results     None            Exam:     Physical Exam:    Gen:  Well-developed, well-nourished, in no acute distress  HEENT:  Pink conjunctivae, PERRL, hearing intact to voice, moist mucous membranes  Neck:  Supple, without masses, thyroid non-tender  Resp:  No accessory muscle use, clear breath sounds without wheezes rales or rhonchi  Card:  No murmurs, tachycardic S1, S2 without thrills, bruits or peripheral edema  Abd:  Soft, non-tender, non-distended, normoactive bowel sounds are present, no mass  Lymph:  No cervical or inguinal adenopathy  Musc:  No cyanosis or clubbing, ROM limited by recent surgery  Skin:  No rashes or ulcers, skin turgor is good  Neuro:  Cranial nerves are grossly intact, no focal motor weakness, follows commands appropriately  Psych:  Good insight, oriented to person, place and time, alert       Labs:    Recent Labs      03/21/18   0334   HGB  11.2*     Recent Labs      03/21/18   0334   NA  140   K  4.8   CL  106   CO2  30   GLU  143*   BUN  12   CREA  0.91   CA  9.6     Lab Results   Component Value Date/Time    Glucose (POC) 159 (H) 03/20/2018 02:14 PM     No results for input(s): PH, PCO2, PO2, HCO3, FIO2 in the last 72 hours. No results for input(s): INR in the last 72 hours.     No lab exists for component: INREXT    I have reviewed previous records,    Telemetry reviewed:   normal sinus rhythm    Risk of deterioration: high      Total time spent with patient: 45 minutes                  Care Plan discussed with: Patient, Family, Nursing Staff, Consultant/Specialist and >50% of time spent in counseling and coordination of care    Discussed:  Care Plan       ___________________________________________________    Attending Physician: Juan Daniel Zeng MD

## 2018-03-21 NOTE — ROUTINE PROCESS
Bedside and Verbal shift change report given to Quinn Espinosa RN (oncoming nurse) by Destiney Macario RN (offgoing nurse). Report included the following information SBAR, Intake/Output and MAR.

## 2018-03-22 LAB
ALBUMIN SERPL-MCNC: 2.7 G/DL (ref 3.5–5)
ALBUMIN/GLOB SERPL: 0.8 {RATIO} (ref 1.1–2.2)
ALP SERPL-CCNC: 70 U/L (ref 45–117)
ALT SERPL-CCNC: 25 U/L (ref 12–78)
ANION GAP SERPL CALC-SCNC: 6 MMOL/L (ref 5–15)
APTT PPP: 36.6 SEC (ref 22.1–32)
APTT PPP: 48 SEC (ref 22.1–32)
APTT PPP: 52 SEC (ref 22.1–32)
APTT PPP: 61.4 SEC (ref 22.1–32)
AST SERPL-CCNC: 20 U/L (ref 15–37)
ATRIAL RATE: 93 BPM
BILIRUB SERPL-MCNC: 0.4 MG/DL (ref 0.2–1)
BUN SERPL-MCNC: 10 MG/DL (ref 6–20)
BUN/CREAT SERPL: 11 (ref 12–20)
CALCIUM SERPL-MCNC: 9.6 MG/DL (ref 8.5–10.1)
CALCULATED P AXIS, ECG09: 46 DEGREES
CALCULATED R AXIS, ECG10: 36 DEGREES
CALCULATED T AXIS, ECG11: 31 DEGREES
CHLORIDE SERPL-SCNC: 102 MMOL/L (ref 97–108)
CO2 SERPL-SCNC: 31 MMOL/L (ref 21–32)
CREAT SERPL-MCNC: 0.94 MG/DL (ref 0.7–1.3)
DIAGNOSIS, 93000: NORMAL
ERYTHROCYTE [DISTWIDTH] IN BLOOD BY AUTOMATED COUNT: 13.7 % (ref 11.5–14.5)
EST. AVERAGE GLUCOSE BLD GHB EST-MCNC: 163 MG/DL
GLOBULIN SER CALC-MCNC: 3.2 G/DL (ref 2–4)
GLUCOSE BLD STRIP.AUTO-MCNC: 159 MG/DL (ref 65–100)
GLUCOSE BLD STRIP.AUTO-MCNC: 192 MG/DL (ref 65–100)
GLUCOSE SERPL-MCNC: 172 MG/DL (ref 65–100)
HBA1C MFR BLD: 7.3 % (ref 4.2–6.3)
HCT VFR BLD AUTO: 36.8 % (ref 36.6–50.3)
HGB BLD-MCNC: 12.2 G/DL (ref 12.1–17)
MAGNESIUM SERPL-MCNC: 1.8 MG/DL (ref 1.6–2.4)
MCH RBC QN AUTO: 28.4 PG (ref 26–34)
MCHC RBC AUTO-ENTMCNC: 33.2 G/DL (ref 30–36.5)
MCV RBC AUTO: 85.8 FL (ref 80–99)
NRBC # BLD: 0 K/UL (ref 0–0.01)
NRBC BLD-RTO: 0 PER 100 WBC
P-R INTERVAL, ECG05: 178 MS
PHOSPHATE SERPL-MCNC: 2.9 MG/DL (ref 2.6–4.7)
PLATELET # BLD AUTO: 165 K/UL (ref 150–400)
PMV BLD AUTO: 9.2 FL (ref 8.9–12.9)
POTASSIUM SERPL-SCNC: 4.1 MMOL/L (ref 3.5–5.1)
PROT SERPL-MCNC: 5.9 G/DL (ref 6.4–8.2)
Q-T INTERVAL, ECG07: 306 MS
QRS DURATION, ECG06: 86 MS
QTC CALCULATION (BEZET), ECG08: 380 MS
RBC # BLD AUTO: 4.29 M/UL (ref 4.1–5.7)
SERVICE CMNT-IMP: ABNORMAL
SERVICE CMNT-IMP: ABNORMAL
SODIUM SERPL-SCNC: 139 MMOL/L (ref 136–145)
THERAPEUTIC RANGE,PTTT: ABNORMAL SECS (ref 58–77)
VENTRICULAR RATE, ECG03: 93 BPM
WBC # BLD AUTO: 9.3 K/UL (ref 4.1–11.1)

## 2018-03-22 PROCEDURE — 74011636637 HC RX REV CODE- 636/637: Performed by: INTERNAL MEDICINE

## 2018-03-22 PROCEDURE — 65610000006 HC RM INTENSIVE CARE

## 2018-03-22 PROCEDURE — 83735 ASSAY OF MAGNESIUM: CPT | Performed by: INTERNAL MEDICINE

## 2018-03-22 PROCEDURE — 84100 ASSAY OF PHOSPHORUS: CPT | Performed by: INTERNAL MEDICINE

## 2018-03-22 PROCEDURE — 80053 COMPREHEN METABOLIC PANEL: CPT | Performed by: INTERNAL MEDICINE

## 2018-03-22 PROCEDURE — 82962 GLUCOSE BLOOD TEST: CPT

## 2018-03-22 PROCEDURE — 85730 THROMBOPLASTIN TIME PARTIAL: CPT | Performed by: INTERNAL MEDICINE

## 2018-03-22 PROCEDURE — 74011250637 HC RX REV CODE- 250/637: Performed by: ORTHOPAEDIC SURGERY

## 2018-03-22 PROCEDURE — 93306 TTE W/DOPPLER COMPLETE: CPT

## 2018-03-22 PROCEDURE — 74011250636 HC RX REV CODE- 250/636: Performed by: INTERNAL MEDICINE

## 2018-03-22 PROCEDURE — 85027 COMPLETE CBC AUTOMATED: CPT | Performed by: INTERNAL MEDICINE

## 2018-03-22 PROCEDURE — 83036 HEMOGLOBIN GLYCOSYLATED A1C: CPT | Performed by: INTERNAL MEDICINE

## 2018-03-22 PROCEDURE — 77010033678 HC OXYGEN DAILY

## 2018-03-22 PROCEDURE — 93005 ELECTROCARDIOGRAM TRACING: CPT

## 2018-03-22 PROCEDURE — 36415 COLL VENOUS BLD VENIPUNCTURE: CPT | Performed by: INTERNAL MEDICINE

## 2018-03-22 RX ORDER — MAGNESIUM SULFATE 100 %
4 CRYSTALS MISCELLANEOUS AS NEEDED
Status: DISCONTINUED | OUTPATIENT
Start: 2018-03-22 | End: 2018-03-25 | Stop reason: HOSPADM

## 2018-03-22 RX ORDER — DEXTROSE 50 % IN WATER (D50W) INTRAVENOUS SYRINGE
12.5-25 AS NEEDED
Status: DISCONTINUED | OUTPATIENT
Start: 2018-03-22 | End: 2018-03-25 | Stop reason: HOSPADM

## 2018-03-22 RX ORDER — INSULIN LISPRO 100 [IU]/ML
INJECTION, SOLUTION INTRAVENOUS; SUBCUTANEOUS
Status: DISCONTINUED | OUTPATIENT
Start: 2018-03-22 | End: 2018-03-25 | Stop reason: HOSPADM

## 2018-03-22 RX ADMIN — FAMOTIDINE 20 MG: 20 TABLET, FILM COATED ORAL at 08:08

## 2018-03-22 RX ADMIN — ALLOPURINOL 200 MG: 100 TABLET ORAL at 22:18

## 2018-03-22 RX ADMIN — Medication 10 ML: at 06:00

## 2018-03-22 RX ADMIN — STANDARDIZED SENNA CONCENTRATE AND DOCUSATE SODIUM 1 TABLET: 8.6; 5 TABLET, FILM COATED ORAL at 17:25

## 2018-03-22 RX ADMIN — Medication 10 ML: at 22:18

## 2018-03-22 RX ADMIN — STANDARDIZED SENNA CONCENTRATE AND DOCUSATE SODIUM 1 TABLET: 8.6; 5 TABLET, FILM COATED ORAL at 08:08

## 2018-03-22 RX ADMIN — OXYCODONE HYDROCHLORIDE 5 MG: 5 TABLET ORAL at 15:34

## 2018-03-22 RX ADMIN — OXYCODONE HYDROCHLORIDE 5 MG: 5 TABLET ORAL at 04:30

## 2018-03-22 RX ADMIN — OXYCODONE HYDROCHLORIDE 5 MG: 5 TABLET ORAL at 20:00

## 2018-03-22 RX ADMIN — ATORVASTATIN CALCIUM 20 MG: 20 TABLET, FILM COATED ORAL at 22:18

## 2018-03-22 RX ADMIN — ACETAMINOPHEN 650 MG: 325 TABLET ORAL at 08:08

## 2018-03-22 RX ADMIN — OXYCODONE HYDROCHLORIDE 10 MG: 5 TABLET ORAL at 22:37

## 2018-03-22 RX ADMIN — Medication 10 ML: at 15:36

## 2018-03-22 RX ADMIN — HEPARIN SODIUM 22.5 UNITS/KG/HR: 10000 INJECTION, SOLUTION INTRAVENOUS at 22:55

## 2018-03-22 RX ADMIN — HEPARIN SODIUM 19.5 UNITS/KG/HR: 10000 INJECTION, SOLUTION INTRAVENOUS at 08:43

## 2018-03-22 RX ADMIN — INSULIN LISPRO 2 UNITS: 100 INJECTION, SOLUTION INTRAVENOUS; SUBCUTANEOUS at 17:25

## 2018-03-22 RX ADMIN — GABAPENTIN 300 MG: 300 CAPSULE ORAL at 08:08

## 2018-03-22 RX ADMIN — ACETAMINOPHEN 650 MG: 325 TABLET ORAL at 01:14

## 2018-03-22 RX ADMIN — POLYETHYLENE GLYCOL 3350 17 G: 17 POWDER, FOR SOLUTION ORAL at 08:08

## 2018-03-22 RX ADMIN — FAMOTIDINE 20 MG: 20 TABLET, FILM COATED ORAL at 17:25

## 2018-03-22 RX ADMIN — GABAPENTIN 300 MG: 300 CAPSULE ORAL at 17:25

## 2018-03-22 NOTE — PROGRESS NOTES
[facility]  [address] [facility], [address] [place] [address]  [phone]      Medical Progress Note      NAME: Jose Han   :  1955  MRM:  [id number]    Date/Time: 3/22/2018          Subjective:     Chief Complaint:  F/u DVT/PE    Chart/notes/labs/studies reviewed, patient examined at bedside. Feels ok. Not much back pain at rest, worse with movement. No CP or SOB or dizziness          Objective:       Vitals:          Last 24hrs VS reviewed since prior progress note. Most recent are:    Visit Vitals    /79 (BP 1 Location: Right arm, BP Patient Position: Post activity)    Pulse (!) 109    Temp 98.2 °F (36.8 °C)    Resp 21    Wt 97 kg (213 lb 13.5 oz)    SpO2 94%    BMI 28.21 kg/m2     SpO2 Readings from Last 6 Encounters:   18 94%   18 97%   18 95%    O2 Flow Rate (L/min): 4 l/min     Intake/Output Summary (Last 24 hours) at 18 8957  Last data filed at 18 0700   Gross per 24 hour   Intake          4331.72 ml   Output             3300 ml   Net          1031.72 ml          Exam:     Physical Exam:    Gen:  Well-developed, well-nourished, in no acute distress. Pleasant  HEENT:  Sclerae nonicteric, hearing intact to voice, mucous membranes moist  Neck:  Supple, without masses. Resp:  No accessory muscle use, CTAB without wheezes, rales, or rhonchi  Card: tachycardic, reg rhythm, without m/r/g. Trace LE edema. Abd:  +bowel sounds, soft, NTTP, nondistended. Neuro: Face symmetric, tongue midline, speech fluent, follows commands appropriately  Psych:  Alert, oriented x 3.  Good insight     Medications Reviewed: (see below)    Lab Data Reviewed: (see below)    ______________________________________________________________________    Medications:     Current Facility-Administered Medications   Medication Dose Route Frequency    heparin 25,000 units in D5W 250 ml infusion  15.5-36 Units/kg/hr IntraVENous TITRATE    gabapentin (NEURONTIN) capsule 300 mg  300 mg Oral BID    atorvastatin (LIPITOR) tablet 20 mg  20 mg Oral QHS    allopurinol (ZYLOPRIM) tablet 200 mg  200 mg Oral QHS    benzocaine-menthol (CEPACOL) lozenge 1 Lozenge  1 Lozenge Mucous Membrane PRN    sodium chloride (NS) flush 5-10 mL  5-10 mL IntraVENous Q8H    sodium chloride (NS) flush 5-10 mL  5-10 mL IntraVENous PRN    naloxone (NARCAN) injection 0.4 mg  0.4 mg IntraVENous PRN    senna-docusate (PERICOLACE) 8.6-50 mg per tablet 1 Tab  1 Tab Oral BID    polyethylene glycol (MIRALAX) packet 17 g  17 g Oral DAILY    bisacodyl (DULCOLAX) suppository 10 mg  10 mg Rectal DAILY PRN    acetaminophen (TYLENOL) tablet 650 mg  650 mg Oral Q6H PRN    oxyCODONE IR (ROXICODONE) tablet 5 mg  5 mg Oral Q3H PRN    oxyCODONE IR (ROXICODONE) tablet 10 mg  10 mg Oral Q3H PRN    ondansetron (ZOFRAN) injection 4 mg  4 mg IntraVENous Q6H PRN    famotidine (PEPCID) tablet 20 mg  20 mg Oral BID    diphenhydrAMINE (BENADRYL) injection 12.5 mg  12.5 mg IntraVENous Q6H PRN    cyclobenzaprine (FLEXERIL) tablet 10 mg  10 mg Oral TID PRN            Lab Review:     Recent Labs      03/22/18   0056  03/21/18   1852  03/21/18   0334   WBC  9.3  8.7   --    HGB  12.2  10.7*  11.2*   HCT  36.8  32.3*   --    PLT  165  141*   --      Recent Labs      03/22/18   0056  03/21/18   0334   NA  139  140   K  4.1  4.8   CL  102  106   CO2  31  30   GLU  172*  143*   BUN  10  12   CREA  0.94  0.91   CA  9.6  9.6   MG  1.8   --    PHOS  2.9   --    ALB  2.7*   --    SGOT  20   --    ALT  25   --      No components found for: GLPOC  No results for input(s): PH, PCO2, PO2, HCO3, FIO2 in the last 72 hours. No results for input(s): INR in the last 72 hours.     No lab exists for component: INREXT  No results found for: SDES  Lab Results   Component Value Date/Time    Culture result: MRSA NOT PRESENT 03/13/2018 02:50 PM    Culture result:  03/13/2018 02:50 PM         Screening of patient nares for MRSA is for surveillance purposes and, if positive, to facilitate isolation considerations in high risk settings. It is not intended for automatic decolonization interventions per se as regimens are not sufficiently effective to warrant routine use. Assessment / Plan:      Acute saddle embolus of pulmonary artery / bilateral LE DVT / bilateral PE: continue IV heparin gtt. Would keep pt on parenteral anticoagulation for at least 24-48 hours before switching over to [address] [place] [address] Road. No massive PE as hemodynamically stable, but certainly would watch closely in the ICU for need to place IVC filter as clot burden is heavy, especially if pt develops bleeding. Bedrest today. TTE today to evaluate for RV strain. Pt does have a family hx of Factor 5 Leiden, so I recommended him getting tested once he is out of the acute phase of VTE, as testing now may lead to false positives. He will need at least 3-6 months of anticoagulation, and maybe lifelong, if he ends up having a hypercoagulable condition. Pulm / critical care to follow as well. Lumbar stenosis with neurogenic claudication: mgmt per ortho, including pain control, PT/ OT (hold this today), disposition     Hyperlipidemia (): cont statin     Gout (): continue allopurinol     Anemia (3/21/2018): Hg normal today.  Monitor for bleeding; follow Hg       Total time spent with patient: 35 minutes, d/w Dr. Mihai Yeager discussed with: Patient, Nursing Staff and >50% of time spent in counseling and coordination of care    Discussed:  Care Plan    Prophylaxis:  heparin    Disposition:  [facility]/Rehab           ___________________________________________________    Attending Physician: Kim Viramontes MD

## 2018-03-22 NOTE — PROGRESS NOTES
Physical Therapy:  Chart reviewed. Patient now with new Bilateral PE's, saddle PE, and bilateral LE DVT's. Transferred to ICU overnight. MD notes indicates bedrest for today. Patient started on heparin drip for anticoagulation, we will continue to follow and re-attempt tomorrow if pTT is therapeutic.   Thank you  Peter Szymanski PT,DPT,NCS

## 2018-03-22 NOTE — PROGRESS NOTES
I met with pt as an introductory visit to discuss discharge needs. Pt  Is  and lives @ the address on demographics in [facility]. Pt states he has a rolling walker @ home. Echo report is pending. Initial assessment completed by 4th floor  yesterday prior to pt being transferred to ICU. PCP is [facility] where pt.'s wife works. When discharged,pt will need a work excuse. He works @ [employer]. Home Health is being recommended by PT  I discussed home health choices with pt and he prefers [facility] as he has had them in the past.[facility] is not able to service pt as pt lives outside the [facility]. [address] [place] [address] is coming to the hospital to discuss services with pt today. Pt has [employer] commercial insurance . Depending upon which NOAC (DOAC) pt is prescribed when discharged,pt will need to receive the coupon for the anticoagulant of physician's choice prior to discharge with instructions on activation to insure pt will receive the medication @ a reduced and affordable cost.    Thank you.   Jacinda Randall

## 2018-03-22 NOTE — DIABETES MGMT
Progress Note     1215: Discussed with pt the importance of glucose management post-op. Mr. Hartman Certain states he does not have a past history of DM and no family history. Discussed need for glucose checks and insulin if required. Johnathan Dalton was agreeable engaged during interview. Was given an opportunity to ask questions. Verbalized understanding of suggestions. Ashok Murray (son) was also present at interview, engaged and asked appropriate questions and verbalized understanding. Tunde Pendleton. Geno Singleton MPH, RN, BSN, [address]   [phone] (office)  [phone] (pager)      Recommendations/ Comments: A1C > 6.4%; no prior history of DM. Sent to PCP; is not currently on any OP medications for DM. If appropriate, please consider the followin. Humalog correction scale with POCT Glucose before meals and bedtime - discussed in IDR; ordered by Dr. May Arshad MD  2. Adding diabetic restrictions to current diet order - discussed in IDR; ordered by Dr. May Arshad MD    Morning glucose: 172 mg/dL (per am lab at 0056). No correction ordered. POC Glucose last 24hrs:   Lab Results   Component Value Date/Time     (H) 2018 12:56 AM        Estimated Creatinine Clearance: 99.9 mL/min (based on Cr of 0.94). Diet order:   Active Orders   Diet    DIET REGULAR        PO intake: Patient Vitals for the past 72 hrs:   % Diet Eaten   18 1348 100 %   18 0922 90 %       A1C:   Lab Results   Component Value Date/Time    Hemoglobin A1c 7.2 (H) 2018 02:50 PM       Reference range*:  Increased risk for diabetes: 5.7 - 6.4%  Diabetes: >6.4%  Glycemic control for adults with diabetes: <7.0 %    *BARBARA KIDD (2014). Diagnosis and classification of diabetes mellitus. Diabetes care, 37, S81. Thank you. Tunde Pendleton.  Beverley OROURKE, RN, BSN, [address]   [phone]    -For most hospitalized persons with hyperglycemia in the intensive care unit (ICU), a glucose range of 140 to 180 mg/dL is recommended, provided this target can be safely achieved. *  - For general medicine and surgery patients in non-ICU settings, a premeal glucose target <140 mg/dL and a random blood glucose <180 mg/dL are recommended. *    Andi Kanner, Y., Ronaldo Morales., Genoveva Rosales., ... & Catie Hernandez ([id number]). AMERICAN ASSOCIATION OF CLINICAL ENDOCRINOLOGISTS AND AMERICAN COLLEGE OF ENDOCRINOLOGY-CLINICAL PRACTICE GUIDELINES FOR DEVELOPING A DIABETES MELLITUS COMPREHENSIVE CARE PLAN-2015-EXECUTIVE SUMMARY: Complete guidelines are available at https://www. aace. com/publications/guidelines. Endocrine Practice, 21(4), S1667342.

## 2018-03-22 NOTE — CONSULTS
[facility]     Name: Duncan Dobbs MRN: [id number]   : 1955 Hospital: [address]   Date: 3/22/2018        Impression Plan   1. Saddle PE  2. Hypoxia  3. Tachycardia  4. S/p laminectomy               · Heparin gtt  · Echo ordered stat to check for right heart strain  · Bedrest for today  · Factor V leiden testing  · If patient doesn not have factor V leiden deficiency, would opt for 3-6 months treatment. Pt is a good candidate for NOAC, but would hold off until proven that surgery site does not bleed with AC and pt has proven himself hemodynamically stable  · Famotidine  · Regular diet  · Discussed with Dr. Anurag Mcclelland           Radiology  ( personally reviewed) CTA chest reviewed: large saddle PE, no infiltrates   ABG No results for input(s): PHI, PO2I, PCO2I in the last 72 hours. Subjective     Cc: pulmonary embolism    60 yo with PMHx of hyperlipidemia who underwent L4-S1 laminectomy on 3/20. Had several weeks of being sedentary prior to this due to back and radiating leg pain. Patient was found to be hypoxic and tachycardic after surgery, although he denies having any shortness of breath throughout all of this. He reports having a sister with factor V leiden deficiency. He has never been tested and never had VTE prior to this. Currently on heparin gtt. Review of Systems:  A comprehensive review of systems was negative except for that written in the HPI. Past Medical History:   Diagnosis Date    Gout     Hyperlipidemia       Past Surgical History:   Procedure Laterality Date    HX COLONOSCOPY N/A     HX KNEE ARTHROSCOPY Bilateral ,    torn meniscus    HX ORTHOPAEDIC Right 2011    remove bone spur on elbow      Prior to Admission medications    Medication Sig Start Date End Date Taking? Authorizing Provider   oxyCODONE-acetaminophen (PERCOCET) 5-325 mg per tablet Take 1 Tab by mouth every four (4) hours as needed for Pain.    Yes Historical Provider cyclobenzaprine (FLEXERIL) 10 mg tablet Take 1 Tab by mouth three (3) times daily as needed for Muscle Spasm(s). 3/20/18  Yes ETIENNE Hodgson   docusate sodium (COLACE) 100 mg capsule Take 1 Cap by mouth two (2) times a day. 3/20/18  Yes ETIENNE Hodgson   promethazine (PHENERGAN) 25 mg tablet Take 1 Tab by mouth every six (6) hours as needed for Nausea. 3/20/18  Yes ETIENNE Hodgson   oxyCODONE IR (ROXICODONE) 5 mg immediate release tablet Take 1-2 tablets PO every 4 to 6 hours prn post-operative pain 3/20/18  Yes ETIENNE Hodgson   atorvastatin (LIPITOR) 20 mg tablet Take 20 mg by mouth nightly. Yes Historical Provider   allopurinol (ZYLOPRIM) 100 mg tablet Take 200 mg by mouth nightly. Yes Historical Provider   gabapentin (NEURONTIN) 300 mg capsule Take 300 mg by mouth two (2) times a day.    Yes Historical Provider     Current Facility-Administered Medications   Medication Dose Route Frequency    heparin 25,000 units in D5W 250 ml infusion  15.5-36 Units/kg/hr IntraVENous TITRATE    gabapentin (NEURONTIN) capsule 300 mg  300 mg Oral BID    atorvastatin (LIPITOR) tablet 20 mg  20 mg Oral QHS    allopurinol (ZYLOPRIM) tablet 200 mg  200 mg Oral QHS    sodium chloride (NS) flush 5-10 mL  5-10 mL IntraVENous Q8H    senna-docusate (PERICOLACE) 8.6-50 mg per tablet 1 Tab  1 Tab Oral BID    polyethylene glycol (MIRALAX) packet 17 g  17 g Oral DAILY    famotidine (PEPCID) tablet 20 mg  20 mg Oral BID     No Known Allergies   Social History   Substance Use Topics    Smoking status: Never Smoker    Smokeless tobacco: Never Used    Alcohol use 1.2 oz/week     2 Cans of beer per week      Comment: does not drink every week      Family History   Problem Relation Age of Onset    Cancer Mother      lung    Heart Failure Father     Heart Attack Father      x2    Hypertension Father     Diabetes Sister      Factor 11 leiden    Other Sister      diverticulitis          Laboratory: I have personally reviewed the critical care flowsheet and labs.      Recent Labs      03/22/18   0056  03/21/18   1852  03/21/18   0334   WBC  9.3  8.7   --    HGB  12.2  10.7*  11.2*   HCT  36.8  32.3*   --    PLT  165  141*   --      Recent Labs      03/22/18   0056  03/21/18   0334   NA  139  140   K  4.1  4.8   CL  102  106   CO2  31  30   GLU  172*  143*   BUN  10  12   CREA  0.94  0.91   CA  9.6  9.6   MG  1.8   --    PHOS  2.9   --    ALB  2.7*   --    SGOT  20   --    ALT  25   --        Objective:     Mode Rate Tidal Volume Pressure FiO2 PEEP                    Vital Signs:     TMAX(24)      Intake/Output:   Last shift:         Last 3 shifts:  [id number]  Intake/Output Summary (Last 24 hours) at 03/22/18 1162  Last data filed at 03/22/18 0700   Gross per 24 hour   Intake          4091.72 ml   Output             3300 ml   Net           791.72 ml     EXAM:   GENERAL: well developed and in no distress, HEENT:  PERRL, EOMI, no alar flaring or epistaxis, oral mucosa moist without cyanosis, NECK:  no jugular vein distention, no retractions, no thyromegaly or masses, LUNGS: CTA, no w/r/r HEART:  Regular rate and rhythm with no MGR; no edema is present, ABDOMEN:  soft with no tenderness, bowel sounds present, EXTREMITIES:  warm with no cyanosis, SKIN:  no jaundice or ecchymosis and NEUROLOGIC:  alert and oriented, grossly non-focal    Caitlyn Montalvo MD  [facility]

## 2018-03-22 NOTE — PROGRESS NOTES
Paged by pharmacy re: heparin IV order. Discussed with ortho, as there were questions re: whether or not patient can receive bolus doses if needed based on PTT, if following the heparin/VTE protocol. Confirmed with ortho that Dr. Orlin Francisco did not want patient to receive the initial bolus dose prior to starting the drip, but is okay with patient receiving bolus doses PRN based on PTT. As such, can follow current heparin order as it stands.

## 2018-03-22 NOTE — PROGRESS NOTES
Occupational Therapy:  Chart reviewed. Patient now with new Bilateral PE's, saddle PE, and bilateral LE DVT's. Transferred to ICU overnight. MD notes indicates bedrest for today. Patient started on heparin drip for anticoagulation, we will continue to follow and re-attempt tomorrow if pTT is therapeutic.   Thank you

## 2018-03-22 NOTE — PROGRESS NOTES
0700: Bedside SBAR report received from [facility]. Labs and plan of care reviewed at this time. Pt stable. ADLs addressed. Call light within reach, bed exit alarm on. Will continue to monitor. 0800: Initial assessment completed. 0830: Dr. Carl Perry at bedside  7894: Dr. Rico Prince at bedside. 1030: Echo at bedside. 1540: Patient complaining of chest tightness. EKG performed  1550: Dr. Ameya Raman reviewing EKG. No new orders received. 1900: Bedside SBAR report given to [facility]. Labs and plan of care reviewed at this time. Call light within reach, bed exit alarm on. Will continue to monitor.

## 2018-03-22 NOTE — PROGRESS NOTES
Attended Interdisciplinary rounds in Critical Care Unit, where patient care was discussed. Visit by: Federico Jones. Lucie Haque.  Ravin Ugalde MA, [address]

## 2018-03-22 NOTE — PROGRESS NOTES
Spiritual Care Assessment/Progress Note  [address]      NAME: Kalie Mcnair      MRN: [id number]  AGE: 58 y.o. SEX: male  Confucianism Affiliation: Hinduism   Language: English     3/22/2018     Total Time (in minutes): 11     Spiritual Assessment begun in [facility] 3 INTENSIVE CARE through conversation with:         [x]Patient        [] Family    [] Friend(s)        Reason for Consult: Initial/Spiritual assessment, critical care     Spiritual beliefs: (Please include comment if needed)     [] Involved in a alfredo tradition/spiritual practice:     [] Supported by a alfredo community:      [] Claims no spiritual orientation:      [] Seeking spiritual identity:           [] Adheres to an individual form of spirituality:      [x] Not able to assess:                     Identified resources for coping:      [] Prayer                  [] Devotional reading               [] Music                  [] Guided Imagery     [x] Family/friends                 [] Pet visits     [] Other:        Interventions offered during this visit: (See comments for more details)    Patient Interventions: Affirmation of emotions/emotional suffering, Catharsis/review of pertinent events in supportive environment, Initial/Spiritual assessment, Critical care, Prayer (assurance of)     Family/Friend(s): Initial Assessment     Plan of Care:     [] Discuss Spiritual/Cultural needs    [] Support AMD and/or advance care planning process      [] Support grieving process   [] Coordinate Rites/Rituals    [] Coordination with community clergy   [x] No spiritual needs identified at this time   [] Detailed Plan of Care below (See Comments)  [] Make referral to Music Therapy  [] Make referral to Pet Therapy     [] Make referral to Addiction services  [] Make referral to [facility]  [] Make referral to Spiritual Care Partner  [] No future visits requested             Comments: Initial visit with patient.   Patient shared how he felt fine and that it was just that everyone kept sharing he was sick. I offered presence and assured pt of pastoral care support. Pastoral care is available to follow as needed. Pt appears well-supported, son present and friend. No specific needs noted at this time. Please page as needed/desired. [phone]. Visit by: Court Chavira. Erica Ventura.  Trisha Mckeon MA, [facility]    Lead  Profession Development & Advancement

## 2018-03-22 NOTE — PROCEDURES
[facility]  *** FINAL REPORT ***    Name: Mitali Wellington  MRN: [id number]    Inpatient  : 26 Oct 1955  HIS Order #: [id number]  [address] [facility] Visit #: [id number]  Date: 21 Mar 2018    TYPE OF TEST: Peripheral Venous Testing    REASON FOR TEST  Pain in limb, Limb swelling, PE    Right Leg:-  Deep venous thrombosis:           Yes  Proximal extent of thrombus:      Posterior Tibial  Superficial venous thrombosis:    No  Deep venous insufficiency:        No  Superficial venous insufficiency: No    Left Leg:-  Deep venous thrombosis:           Yes  Proximal extent of thrombus:      Popliteal Above The Knee  Superficial venous thrombosis:    No  Deep venous insufficiency:        No  Superficial venous insufficiency: No      INTERPRETATION/FINDINGS  PROCEDURE:  BILATERAL LE VENOUS DUPLEX. Evaluation of lower extremity veins with ultrasound (B-mode imaging,  pulsed Doppler, color Doppler). Includes the common femoral, deep  femoral, femoral, popliteal, posterior tibial, peroneal, and great  saphenous veins. FINDINGS:  Adiel [place] scale and color flow duplex images of the left  popliteal, bilateral posterior tibial, bilateral peroneal, including a   left  branch demonstrate partial/no compressibility, with  filling defects. Thrombus appears acute. CONCLUSION:  Bilateral lower extremity venous duplex positive for  acute deep venous thrombosis involving the bilateral posterior tibial,   bilateral peroneal, left popliteal and left  branch at the  mid calf. ADDITIONAL COMMENTS    I have personally reviewed the data relevant to the interpretation of  this  study. TECHNOLOGIST: Bernabe Lewis. [place]  Signed: 3/21/2018 8:00:10 PM    PHYSICIAN: Robert Esposito.  Roosevelt Ortiz MD  Signed: 3/22/2018 9:18:17 AM

## 2018-03-22 NOTE — PROGRESS NOTES
2000-Initial Shift Assessment Complete. Pt is A& O x 4, VSS Temp 99.0. Pt denies pain at this time. Vas @ bedside preliminary report to follow. Will continue to monitor. 2032- Preliminary Results called to Dr. Johnie Grace d/cd order for SCD's. Otherwise no additional orders. 0000- IVF/PCA Stopped Per MD Orders. Pt given PRN Lelia. 0100- Blood Drawn PTT results Pending. 0227- Heparin gtt adjusted per Heparin gtt Protocol minus bolus per ortho. Now infusing @ 19.5. 0400- Reassess- No change. 0430- PRN Lelia 5 for 3/10 Back Pain. Shift Summary:  VSS 2-4 L NC  Drain OP- 225  UO - 2375  PCA D/C PRN Lelia now. Zofia Thompson RN

## 2018-03-22 NOTE — ROUTINE PROCESS
8621- Bedside verbal report received from [address] [place] [address] in [employer] using the Kardex, [facility], Notes, and Recent Results. A dual skin assessment was performed on this patient  Impairment noted- see wound doc flow sheet All Gtts Verified     0715-Bedside and Verbal shift change report given to Elizabeth Manley RN (oncoming nurse) by Aide Nelson RN (offgoing nurse). Report included the following information SBAR, Kardex, Intake/Output, MAR and Recent Results. Zofia Thompson RN

## 2018-03-23 LAB
ANION GAP SERPL CALC-SCNC: 9 MMOL/L (ref 5–15)
APTT PPP: 53.3 SEC (ref 22.1–32)
APTT PPP: 53.8 SEC (ref 22.1–32)
ATRIAL RATE: 92 BPM
BUN SERPL-MCNC: 9 MG/DL (ref 6–20)
BUN/CREAT SERPL: 9 (ref 12–20)
CALCIUM SERPL-MCNC: 10.2 MG/DL (ref 8.5–10.1)
CALCULATED P AXIS, ECG09: 47 DEGREES
CALCULATED R AXIS, ECG10: 38 DEGREES
CALCULATED T AXIS, ECG11: 18 DEGREES
CHLORIDE SERPL-SCNC: 101 MMOL/L (ref 97–108)
CO2 SERPL-SCNC: 29 MMOL/L (ref 21–32)
CREAT SERPL-MCNC: 0.99 MG/DL (ref 0.7–1.3)
DIAGNOSIS, 93000: NORMAL
GLUCOSE BLD STRIP.AUTO-MCNC: 152 MG/DL (ref 65–100)
GLUCOSE BLD STRIP.AUTO-MCNC: 155 MG/DL (ref 65–100)
GLUCOSE BLD STRIP.AUTO-MCNC: 174 MG/DL (ref 65–100)
GLUCOSE BLD STRIP.AUTO-MCNC: 230 MG/DL (ref 65–100)
GLUCOSE SERPL-MCNC: 173 MG/DL (ref 65–100)
HGB BLD-MCNC: 12.8 G/DL (ref 12.1–17)
P-R INTERVAL, ECG05: 186 MS
POTASSIUM SERPL-SCNC: 3.2 MMOL/L (ref 3.5–5.1)
Q-T INTERVAL, ECG07: 318 MS
QRS DURATION, ECG06: 80 MS
QTC CALCULATION (BEZET), ECG08: 393 MS
SERVICE CMNT-IMP: ABNORMAL
SODIUM SERPL-SCNC: 139 MMOL/L (ref 136–145)
THERAPEUTIC RANGE,PTTT: ABNORMAL SECS (ref 58–77)
THERAPEUTIC RANGE,PTTT: ABNORMAL SECS (ref 58–77)
VENTRICULAR RATE, ECG03: 92 BPM

## 2018-03-23 PROCEDURE — 74011250637 HC RX REV CODE- 250/637: Performed by: INTERNAL MEDICINE

## 2018-03-23 PROCEDURE — 77010033678 HC OXYGEN DAILY

## 2018-03-23 PROCEDURE — 74011250637 HC RX REV CODE- 250/637: Performed by: ORTHOPAEDIC SURGERY

## 2018-03-23 PROCEDURE — 85730 THROMBOPLASTIN TIME PARTIAL: CPT | Performed by: INTERNAL MEDICINE

## 2018-03-23 PROCEDURE — 80048 BASIC METABOLIC PNL TOTAL CA: CPT | Performed by: ORTHOPAEDIC SURGERY

## 2018-03-23 PROCEDURE — 36415 COLL VENOUS BLD VENIPUNCTURE: CPT | Performed by: ORTHOPAEDIC SURGERY

## 2018-03-23 PROCEDURE — 82962 GLUCOSE BLOOD TEST: CPT

## 2018-03-23 PROCEDURE — 97535 SELF CARE MNGMENT TRAINING: CPT | Performed by: OCCUPATIONAL THERAPIST

## 2018-03-23 PROCEDURE — 74011636637 HC RX REV CODE- 636/637: Performed by: INTERNAL MEDICINE

## 2018-03-23 PROCEDURE — 97530 THERAPEUTIC ACTIVITIES: CPT

## 2018-03-23 PROCEDURE — 65610000006 HC RM INTENSIVE CARE

## 2018-03-23 PROCEDURE — 85018 HEMOGLOBIN: CPT | Performed by: ORTHOPAEDIC SURGERY

## 2018-03-23 RX ORDER — POTASSIUM CHLORIDE 750 MG/1
40 TABLET, FILM COATED, EXTENDED RELEASE ORAL
Status: COMPLETED | OUTPATIENT
Start: 2018-03-23 | End: 2018-03-23

## 2018-03-23 RX ADMIN — POLYETHYLENE GLYCOL 3350 17 G: 17 POWDER, FOR SOLUTION ORAL at 08:30

## 2018-03-23 RX ADMIN — Medication 10 ML: at 21:36

## 2018-03-23 RX ADMIN — POTASSIUM CHLORIDE 40 MEQ: 750 TABLET, EXTENDED RELEASE ORAL at 12:04

## 2018-03-23 RX ADMIN — STANDARDIZED SENNA CONCENTRATE AND DOCUSATE SODIUM 1 TABLET: 8.6; 5 TABLET, FILM COATED ORAL at 17:24

## 2018-03-23 RX ADMIN — STANDARDIZED SENNA CONCENTRATE AND DOCUSATE SODIUM 1 TABLET: 8.6; 5 TABLET, FILM COATED ORAL at 08:29

## 2018-03-23 RX ADMIN — OXYCODONE HYDROCHLORIDE 10 MG: 5 TABLET ORAL at 12:04

## 2018-03-23 RX ADMIN — INSULIN LISPRO 2 UNITS: 100 INJECTION, SOLUTION INTRAVENOUS; SUBCUTANEOUS at 08:29

## 2018-03-23 RX ADMIN — INSULIN LISPRO 2 UNITS: 100 INJECTION, SOLUTION INTRAVENOUS; SUBCUTANEOUS at 17:24

## 2018-03-23 RX ADMIN — INSULIN LISPRO 2 UNITS: 100 INJECTION, SOLUTION INTRAVENOUS; SUBCUTANEOUS at 12:16

## 2018-03-23 RX ADMIN — Medication 10 ML: at 06:23

## 2018-03-23 RX ADMIN — INSULIN LISPRO 2 UNITS: 100 INJECTION, SOLUTION INTRAVENOUS; SUBCUTANEOUS at 21:35

## 2018-03-23 RX ADMIN — FAMOTIDINE 20 MG: 20 TABLET, FILM COATED ORAL at 08:30

## 2018-03-23 RX ADMIN — GABAPENTIN 300 MG: 300 CAPSULE ORAL at 17:24

## 2018-03-23 RX ADMIN — ATORVASTATIN CALCIUM 20 MG: 20 TABLET, FILM COATED ORAL at 21:35

## 2018-03-23 RX ADMIN — ALLOPURINOL 200 MG: 100 TABLET ORAL at 21:35

## 2018-03-23 RX ADMIN — FAMOTIDINE 20 MG: 20 TABLET, FILM COATED ORAL at 17:23

## 2018-03-23 RX ADMIN — RIVAROXABAN 15 MG: 10 TABLET, FILM COATED ORAL at 17:23

## 2018-03-23 RX ADMIN — GABAPENTIN 300 MG: 300 CAPSULE ORAL at 08:30

## 2018-03-23 RX ADMIN — OXYCODONE HYDROCHLORIDE 10 MG: 5 TABLET ORAL at 02:51

## 2018-03-23 NOTE — PROGRESS NOTES
Problem: Pulmonary Embolism Care Plan (Adult)  Goal: *Absence of bleeding  Outcome: Progressing Towards Goal  Tolerating Heparin gtt accordingly   Goal: *Labs within defined limits  Outcome: Not Progressing Towards Goal  Hep gtt continues to be titrated Hgb stable

## 2018-03-23 NOTE — PROGRESS NOTES
ORTHOPAEDIC LUMBAR FUSION PROGRESS NOTE    NAME:     Prisca Theodore   :       1955   MRN:       [id number]   DATE:      3/23/2018    POD:    3 Days Post-Op  S/P:    Procedure(s):  L4-S1 LAMINECTOMY, FUSION, INSTRUMENTATION, TLIF, BONE GRAFT    SUBJECTIVE:    C/O back pain along surgical incision  No leg pain or numbness  Denies nausea/vomiting, chest pain, headache or shortness of breath  Pain controlled    Recent Labs      18   0236  18   0056   HGB  12.8  12.2   HCT   --   36.8   NA  139  139   K  3.2*  4.1   CL  101  102   CO2  29  31   BUN  9  10   CREA  0.99  0.94   GLU  173*  172*     Patient Vitals for the past 12 hrs:   BP Temp Pulse Resp SpO2   18 0700 115/77 98.4 °F (36.9 °C) 89 (!) 4 97 %   18 0600 110/80 - 93 15 96 %   18 0500 115/72 - 99 11 95 %   18 0400 115/79 98.7 °F (37.1 °C) (!) 102 14 96 %   18 0300 121/83 - 96 23 99 %   18 0200 119/79 - (!) 102 17 (!) 88 %   18 0100 120/66 - (!) 101 20 96 %   18 0000 128/78 98.7 °F (37.1 °C) (!) 101 19 97 %   18 2317 - - (!) 102 - -   18 2300 128/78 - (!) 104 17 96 %   18 2200 116/60 - (!) 115 23 96 %       EXAM:  Dressings clean, dry and intact   Positive strength/ROM bilat lower ext.   Neuro intact to sensation  Calves, soft & nontender  BL LEs NVID      PLAN:  May be able to transition to an oral anticoagulant if hemovac output is ok this AM  Continue prn PO pain medications  PT/OT, OOB w/ assist- Can mobilize today if ok with medical team  [facility] [phone]  Orthopaedic Surgery  Physician Assistant to Dr. Darvin Gage

## 2018-03-23 NOTE — PROGRESS NOTES
BEDSIDE REPORT - ASSUME CARE    0700: Bedside shift change report received by Wilfredo Caballero RN. Report given with SBAR, Kardex, Intake/Output and Recent Results. Opportunity for questions and clarification was provided. Assumed care of patient. Safety instructions given and acknowledged by pt.  1400: Tolerated chair activity with elevated HR to 130. BEDSIDE REPORT TO ONCOMING RN    1900: Bedside shift change report given to RN (oncoming nurse) by Luis Enrique Ogden RN (offgoing nurse). Report given with SBAR, Kardex, Intake/Output and Recent Results. Opportunity for questions and clarification was provided.

## 2018-03-23 NOTE — PROGRESS NOTES
When stable for discharge,pt has been accepted by [facility] in [place], [place]. per linwood from orthopedic surgery. The number for home health is [phone]. Pt.'s wife is obtaining FMLA papers from pt.'s employer for Dr Funmilayo Rice team to please complete and leave on chart once she brings them in. I will check with pt when he wakes up this morning about status of his FMLA papers. When discharged,pt will need a work excuse. Please consult case management or notify case management when a NOAC has been added so pt can receive the coupon to go with pt.'s NOAC and check to insure affordability. To price the anticoagulant for pt,a prescription will either need to be provided by attending so case management can fax it to [phone] in [facility] or if attending prefers to electronically fax the script to [facility] management can call pharmacist @ [facility] to check insurance coverage and coupon coverage for pt . Pt uses [facility] in [id number] [place] [address]. Case Management will continue to follow pt throughout hospitalization for discharge needs.     Jeremie Brandt

## 2018-03-23 NOTE — PROGRESS NOTES
Occupational Therapy TREATMENT  Patient: Jose Han (25 y.o. male)  Date: 3/23/2018  Diagnosis: Lumbar adjacent segment disease with spondylolisthesis [M51.36, M43.16]  Lumbar stenosis with neurogenic claudication [M48.062] Lumbar stenosis with neurogenic claudication  Procedure(s) (LRB):  L4-S1 LAMINECTOMY, FUSION, INSTRUMENTATION, TLIF, BONE GRAFT (N/A) 3 Days Post-Op  Precautions: Back, Fall No bending, no lifting greater than 5 lbs, no twisting, log-roll technique, repositioning every 20-30 min except when sleeping, LSO brace when OOB  Chart, occupational therapy assessment, plan of care, and goals were reviewed. ASSESSMENT:  Pt is making slow, steady progress toward goals. Pt with saddle PE and cleared for bed to chair activity by MD today. HR 110s at rest and 130s with activity with O2 sats 89-94% and RR in the 20s. He required min A for bed mobility, min A to manage LSO brace, mod A for LE dressing and min A for functional mobility OOB to chair using RW. He remains limited by back pain, decreased strength, endurance, balance and safety. Recommend IP rehab at discharge. Pt is motivated for increased independence and can tolerate at least 3 hours of intense therapy a day. Progression toward goals:  []          Improving appropriately and progressing toward goals  [x]          Improving slowly and progressing toward goals  []          Not making progress toward goals and plan of care will be adjusted     PLAN:  Patient continues to benefit from skilled intervention to address the above impairments. Continue treatment per established plan of care. Discharge Recommendations:  Inpatient Rehab  Further Equipment Recommendations for Discharge:  TBD     SUBJECTIVE:   Patient stated I feel ok.   The patient stated 3/3 back precautions. Reviewed all 3 with patient.     OBJECTIVE DATA SUMMARY:   Cognitive/Behavioral Status:  Neurologic State: Alert, Appropriate for age  Orientation Level: Oriented X4  Cognition: Appropriate for age attention/concentration, Follows commands  Safety/Judgement: Awareness of environment, Fall prevention, Insight into deficits    Functional Mobility and Transfers for ADLs:  Bed Mobility:  Rolling: Minimum assistance; Additional time;Assist x1 (log roll)  Supine to Sit: Minimum assistance;Assist x1  Scooting: Contact guard assistance; Additional time;Assist x1    Transfers:  Sit to Stand: Minimum assistance;Assist x1      Bed to Chair: Minimum assistance; Additional time;Assist x1 (using RW)    Balance:  Sitting: Intact  Standing: Impaired; With support (RW)  Standing - Static: Fair;Constant support  Standing - Dynamic : Fair    ADL Intervention and Instruction:  Upper Body Dressing Assistance  Orthotics(Brace): Minimum assistance (A to properly don LSO brace)  Hospital Gown: Minimum  assistance (A to tie at neck)  Cues: Physical assistance; Tactile cues provided;Verbal cues provided;Visual cues provided    Lower Body Dressing Assistance  Pants With Elastic Waist: Moderate assistance (A to thread over feet and for safety with standing balance)  Socks: Maximum assistance (A to thread over toes- pt able to reach once at heels)  Leg Crossed Method Used: Yes (modified)  Position Performed: Seated in chair  Cues: Don;Doff;Physical assistance; Tactile cues provided;Verbal cues provided;Visual cues provided    Cognitive Retraining  Safety/Judgement: Awareness of environment; Fall prevention; Insight into deficits     Bathing: Patient instructed and indicated understanding when bathing to not submerge wound in water, stand to shower or sponge bathe, cover wound with plastic and tape to ensure no water reaches bandage/wound without cues. Dressing brace: Patient instructed and demonstrated to don/doff velcro on brace using dominant side, keeping non-dominant side intact.  Patient instructed and demonstrated in meantime of being able to stand with back against wall to don/doff brace, to don/doff seated using lap and bed/chair surface to support brace while manipulating. Dressing lower body: Patient instructed to don brace first and on the benefits to remain seated to don all clothing to increase independence with precautions and pain management. Toileting: Patient instructed on the benefits of using flushable wet wipes and toilet tongs if decreased reach or pain for lul care. Also, the benefits of a reacher to aid in clothing management. Home safety: Patient instructed and indicated understanding on home modifications and safety (raise height of ADL objects, appropriate height of chair surfaces, recliner safety, change of floor surfaces, clear pathways) to increase independence and fall prevention. Standing: Patient instructed and indicated understanding to walk up to sink/counter top/surfaces, step into walker, square off while using objects, slide objects along surfaces, to increase adherence to back precautions and fall prevention. Patient instructed to increase amount of time standing in order to increase independence and tolerance with ADLs. During prolonged standing, can open cabinet door or place foot on stool to decrease spinal pressure/increase pain. Tub transfer: Patient instructed and indicated understanding regarding when it is safe to begin transfer into tub (complete stairs with PT, advance exercises with PT high enough to clear tub height, and while clothes donned practice with another person present). Patient instructed and indicated understanding to use the same technique as used with stairs when entering and exiting tub (\"up with good leg, down with bad leg\"). Patient instructed and indicated understanding the benefits of maintaining activity tolerance, functional mobility, and independence with self care tasks during acute stay  to ensure safe return home and to baseline.  Encouraged patient to increase frequency and duration OOB, not sitting longer than 30 mins without marching/walking with staff, be out of bed for all meals, perform daily ADLs (as approved by RN/MD regarding bathing etc), and performing functional mobility to/from bathroom. Patient instruction and indicated understanding on body mechanics, ergonomics and gravitational force on the spine during different body positions to plan activities in prep for return home to complete instrumental ADLs and back to work safely. Patient instructed and demonstrated while supine hip ER stretch and hold 10 seconds to increase ROM in prep for lower body ADLs daily with Minimum assistance, Additional time and Assist x1. Pain:  Pain Scale 1: Numeric (0 - 10)  Pain Intensity 1: 2  Pain Location 1: Back  Pain Orientation 1: Medial  Pain Description 1: Aching  Pain Intervention(s) 1: Declines    Activity Tolerance:   Fair  Please refer to the flowsheet for vital signs taken during this treatment.   After treatment:   [x] Patient left in no apparent distress sitting up in chair  [] Patient left in no apparent distress in bed  [x] Call bell left within reach  [x] Nursing notified  [] Caregiver present  [] Bed alarm activated    COMMUNICATION/COLLABORATION:   The patients plan of care was discussed with: Physical Therapist and Registered Nurse    Aiden Powell OT  Time Calculation: 27 mins

## 2018-03-23 NOTE — PROGRESS NOTES
2000-Initial Shift Assessment Complete. Pt is A & O x 4 More Restless/Anxious Tonight, VSS Temp 99.4. Pt c/o back  pain at this time medicated PRN Lelia 5 MG. Will continue to monitor. 0000- Reassess- No change. 0400-   Reassess- Medicated PRN Lelia x 2. Shift Summary:  VSS   Adequate UO  + Flatus - BM despite trying several times. Zofia Thompson RN

## 2018-03-23 NOTE — PROGRESS NOTES
Willy Milan reema Edmond 79  [address] [facility], [address] [place] [address]  [phone]      Medical Progress Note      NAME: Ana Bumpers   :  1955  MRM:  [id number]    Date/Time: 3/23/2018          Subjective:     Chief Complaint:  F/u DVT/PE    Chart/notes/labs/studies reviewed, patient examined at bedside. Feels ok. No CP or SOB. Asking about when he can go back to work          Objective:       Vitals:          Last 24hrs VS reviewed since prior progress note. Most recent are:    Visit Vitals    /77 (BP 1 Location: Right arm, BP Patient Position: At rest)    Pulse 89    Temp 98.4 °F (36.9 °C)    Resp (!) 4    Wt 97 kg (213 lb 13.5 oz)    SpO2 97%    BMI 28.21 kg/m2     SpO2 Readings from Last 6 Encounters:   18 97%   18 97%   18 95%    O2 Flow Rate (L/min): 2 l/min       Intake/Output Summary (Last 24 hours) at 18 1233  Last data filed at 18 1133   Gross per 24 hour   Intake           341.71 ml   Output             3260 ml   Net         -2918.29 ml          Exam:     Physical Exam:    Gen:  Well-developed, well-nourished, in no acute distress. Pleasant  HEENT:  Sclerae nonicteric, hearing intact to voice, mucous membranes moist  Neck:  Supple, without masses. Resp:  No accessory muscle use, CTAB without wheezes, rales, or rhonchi  Card: tachycardic, reg rhythm, without m/r/g. Trace LE edema. Abd:  +bowel sounds, soft, NTTP, nondistended. Neuro: Face symmetric, tongue midline, speech fluent, follows commands appropriately  Psych:  Alert, oriented x 3.  Good insight     Medications Reviewed: (see below)    Lab Data Reviewed: (see below)    ______________________________________________________________________    Medications:     Current Facility-Administered Medications   Medication Dose Route Frequency    glucose chewable tablet 16 g  4 Tab Oral PRN    dextrose (D50W) injection syrg 12.5-25 g  12.5-25 g IntraVENous PRN    glucagon (GLUCAGEN) injection 1 mg  1 mg IntraMUSCular PRN    insulin lispro (HUMALOG) injection   SubCUTAneous AC&HS    heparin 25,000 units in D5W 250 ml infusion  15.5-36 Units/kg/hr IntraVENous TITRATE    gabapentin (NEURONTIN) capsule 300 mg  300 mg Oral BID    atorvastatin (LIPITOR) tablet 20 mg  20 mg Oral QHS    allopurinol (ZYLOPRIM) tablet 200 mg  200 mg Oral QHS    benzocaine-menthol (CEPACOL) lozenge 1 Lozenge  1 Lozenge Mucous Membrane PRN    sodium chloride (NS) flush 5-10 mL  5-10 mL IntraVENous Q8H    sodium chloride (NS) flush 5-10 mL  5-10 mL IntraVENous PRN    naloxone (NARCAN) injection 0.4 mg  0.4 mg IntraVENous PRN    senna-docusate (PERICOLACE) 8.6-50 mg per tablet 1 Tab  1 Tab Oral BID    polyethylene glycol (MIRALAX) packet 17 g  17 g Oral DAILY    bisacodyl (DULCOLAX) suppository 10 mg  10 mg Rectal DAILY PRN    acetaminophen (TYLENOL) tablet 650 mg  650 mg Oral Q6H PRN    oxyCODONE IR (ROXICODONE) tablet 5 mg  5 mg Oral Q3H PRN    oxyCODONE IR (ROXICODONE) tablet 10 mg  10 mg Oral Q3H PRN    ondansetron (ZOFRAN) injection 4 mg  4 mg IntraVENous Q6H PRN    famotidine (PEPCID) tablet 20 mg  20 mg Oral BID    diphenhydrAMINE (BENADRYL) injection 12.5 mg  12.5 mg IntraVENous Q6H PRN    cyclobenzaprine (FLEXERIL) tablet 10 mg  10 mg Oral TID PRN            Lab Review:     Recent Labs      03/23/18   0236  03/22/18   0056  03/21/18   1852   WBC   --   9.3  8.7   HGB  12.8  12.2  10.7*   HCT   --   36.8  32.3*   PLT   --   165  141*     Recent Labs      03/23/18   0236  03/22/18   0056  03/21/18   0334   NA  139  139  140   K  3.2*  4.1  4.8   CL  101  102  106   CO2  29  31  30   GLU  173*  172*  143*   BUN  9  10  12   CREA  0.99  0.94  0.91   CA  10.2*  9.6  9.6   MG   --   1.8   --    PHOS   --   2.9   --    ALB   --   2.7*   --    SGOT   --   20   --    ALT   --   25   --      No components found for: GLPOC  No results for input(s): PH, PCO2, PO2, HCO3, FIO2 in the last 72 hours. No results for input(s): INR in the last 72 hours. No lab exists for component: INREXT, INREXT  No results found for: SDES  Lab Results   Component Value Date/Time    Culture result: MRSA NOT PRESENT 03/13/2018 02:50 PM    Culture result:  03/13/2018 02:50 PM         Screening of patient nares for MRSA is for surveillance purposes and, if positive, to facilitate isolation considerations in high risk settings. It is not intended for automatic decolonization interventions per se as regimens are not sufficiently effective to warrant routine use. Assessment / Plan:     Acute saddle embolus of pulmonary artery / bilateral LE DVT / bilateral PE: continue IV heparin gtt with plan to transition to NOAC (xarelto or eliquis are fine) this evening if surgery okay with vac. TTE without RV strain. Factor V Leiden was negative so anticoagulation for 3-6 months is appropriate. He can mobilize today. Appreciate pulm input as well. Lumbar stenosis with neurogenic claudication: mgmt per ortho, including pain control, PT/ OT, disposition     Hyperlipidemia (): cont statin     Gout (): continue allopurinol     Anemia (3/21/2018): Hg normal today.  Monitor for bleeding; follow Hg     Will sign off, please feel free to contact with any further questions    Total time spent with patient: 25 minutes                 Care Plan discussed with: Patient, Nursing Staff and >50% of time spent in counseling and coordination of care    Discussed:  Care Plan    Prophylaxis:  heparin    Disposition:  [facility]/Rehab           ___________________________________________________    Attending Physician: Santa Chou DO

## 2018-03-23 NOTE — PROGRESS NOTES
[facility]     Name: Rancho Harvey MRN: [id number]   : 1955 Hospital: [facility]   Date: 3/23/2018        Impression Plan   1. Saddle PE  2. Hypoxia  3. Tachycardia  4. S/p laminectomy               · Heparin gtt. Can start a Noac tonight if OK by all services involved. · No RV strain on Echo  · OOB into chair today   · Factor V leiden pending  · If patient doesn not have factor V leiden deficiency, would opt for 3-6 months treatment. · Famotidine  · Regular diet  · Transfer to stepdown if he does well [facility]           Radiology  ( personally reviewed) CTA chest reviewed: large saddle PE, no infiltrates   ABG No results for input(s): PHI, PO2I, PCO2I in the last 72 hours. Subjective     Overnight events:  Feels well, denies SOB/CP. Mild to moderate back pain. Past Medical History:   Diagnosis Date    Gout     Hyperlipidemia       Past Surgical History:   Procedure Laterality Date    HX COLONOSCOPY N/A     HX KNEE ARTHROSCOPY Bilateral ,    torn meniscus    HX ORTHOPAEDIC Right 2011    remove bone spur on elbow      Prior to Admission medications    Medication Sig Start Date End Date Taking? Authorizing Provider   oxyCODONE-acetaminophen (PERCOCET) 5-325 mg per tablet Take 1 Tab by mouth every four (4) hours as needed for Pain. Yes Historical Provider   cyclobenzaprine (FLEXERIL) 10 mg tablet Take 1 Tab by mouth three (3) times daily as needed for Muscle Spasm(s). 3/20/18  Yes ETIENNE Kirby   docusate sodium (COLACE) 100 mg capsule Take 1 Cap by mouth two (2) times a day. 3/20/18  Yes ETIENNE Kirby   promethazine (PHENERGAN) 25 mg tablet Take 1 Tab by mouth every six (6) hours as needed for Nausea.  3/20/18  Yes ETIENNE Kirby   oxyCODONE IR (ROXICODONE) 5 mg immediate release tablet Take 1-2 tablets PO every 4 to 6 hours prn post-operative pain 3/20/18  Yes ETIENNE Kirby   atorvastatin (LIPITOR) 20 mg tablet Take 20 mg by mouth nightly. Yes Historical Provider   allopurinol (ZYLOPRIM) 100 mg tablet Take 200 mg by mouth nightly. Yes Historical Provider   gabapentin (NEURONTIN) 300 mg capsule Take 300 mg by mouth two (2) times a day. Yes Historical Provider     Current Facility-Administered Medications   Medication Dose Route Frequency    insulin lispro (HUMALOG) injection   SubCUTAneous AC&HS    heparin 25,000 units in D5W 250 ml infusion  15.5-36 Units/kg/hr IntraVENous TITRATE    gabapentin (NEURONTIN) capsule 300 mg  300 mg Oral BID    atorvastatin (LIPITOR) tablet 20 mg  20 mg Oral QHS    allopurinol (ZYLOPRIM) tablet 200 mg  200 mg Oral QHS    sodium chloride (NS) flush 5-10 mL  5-10 mL IntraVENous Q8H    senna-docusate (PERICOLACE) 8.6-50 mg per tablet 1 Tab  1 Tab Oral BID    polyethylene glycol (MIRALAX) packet 17 g  17 g Oral DAILY    famotidine (PEPCID) tablet 20 mg  20 mg Oral BID     No Known Allergies   Social History   Substance Use Topics    Smoking status: Never Smoker    Smokeless tobacco: Never Used    Alcohol use 1.2 oz/week     2 Cans of beer per week      Comment: does not drink every week      Family History   Problem Relation Age of Onset    Cancer Mother      lung    Heart Failure Father     Heart Attack Father      x2    Hypertension Father     Diabetes Sister      Factor 11 leiden    Other Sister      diverticulitis          Laboratory: I have personally reviewed the critical care flowsheet and labs.      Recent Labs      03/23/18 0236  03/22/18   0056  03/21/18   1852   WBC   --   9.3  8.7   HGB  12.8  12.2  10.7*   HCT   --   36.8  32.3*   PLT   --   165  141*     Recent Labs      03/23/18   0236  03/22/18   0056  03/21/18   0334   NA  139  139  140   K  3.2*  4.1  4.8   CL  101  102  106   CO2  29  31  30   GLU  173*  172*  143*   BUN  9  10  12   CREA  0.99  0.94  0.91   CA  10.2*  9.6  9.6   MG   --   1.8   --    PHOS   --   2.9   --    ALB   --   2.7*   --    SGOT   --   20 --    ALT   --   25   --        Objective:     Mode Rate Tidal Volume Pressure FiO2 PEEP                    Vital Signs:     TMAX(24)      Intake/Output:   Last shift:         Last 3 shifts: 03/23 0701 - 03/23 1900  In: -   Out: 425 [Urine:425][id number]    Intake/Output Summary (Last 24 hours) at 03/23/18 0941  Last data filed at 03/23/18 0804   Gross per 24 hour   Intake           404.04 ml   Output             3500 ml   Net         -3095.96 ml     EXAM:   GENERAL: well developed and in no distress, HEENT:  PERRL, EOMI, no alar flaring or epistaxis, oral mucosa moist without cyanosis, NECK:  no jugular vein distention, no retractions, no thyromegaly or masses, LUNGS: CTA, no w/r/r HEART:  Regular rate and rhythm with no MGR; no edema is present, ABDOMEN:  soft with no tenderness, bowel sounds present, EXTREMITIES:  warm with no cyanosis, SKIN:  no jaundice or ecchymosis and NEUROLOGIC:  alert and oriented, grossly non-focal    Neno Wiggins MD  [facility]

## 2018-03-23 NOTE — PROGRESS NOTES
Problem: Mobility Impaired (Adult and Pediatric)  Goal: *Acute Goals and Plan of Care (Insert Text)  Physical Therapy Goals  Initiated 3/21/2018    1. Patient will move from supine to sit and sit to supine  in bed with independence within 4 days. 2. Patient will perform sit to stand with modified independence within 4 days. 3. Patient will ambulate with modified independence for 100 feet with the least restrictive device within 4 days. 4. Patient will ascend/descend 4 stairs with both handrail(s) with modified independence within 4 days. 5. Patient will verbalize and demonstrate understanding of spinal precautions (No bending, lifting greater than 5 lbs, or twisting; log-roll technique; frequent repositioning as instructed) within 4 days. physical Therapy TREATMENT  Patient: Dillon Albarran (89 y.o. male)  Date: 3/23/2018  Diagnosis: Lumbar adjacent segment disease with spondylolisthesis [M51.36, M43.16]  Lumbar stenosis with neurogenic claudication [M48.062] Lumbar stenosis with neurogenic claudication  Procedure(s) (LRB):  L4-S1 LAMINECTOMY, FUSION, INSTRUMENTATION, TLIF, BONE GRAFT (N/A) 3 Days Post-Op  Precautions: Back, Fall  Chart, physical therapy assessment, plan of care and goals were reviewed. ASSESSMENT:  Patient is able verbalize back precautions but need to be reminded not to twist or sit for greater than 30-45 minutes at a time. He would benefit from one more review in order to make sure is safe and maintain his back precautions. Patient's heart rate is around 100 bpm in supine once he sits at the edge of the bed the patient's heart rate increases to 120 bpm. While patient sits at the edge of bed his heart rate lowers. Patient is min A x 1 for all mobility, and is contact guard assist with rolling walker, step to gait and increased time in order to manage lines and leads and due to pain.  After patient ambulates the the chair his heart rate continues to increase to 136 bpm. Patient's heart rate returns to 100 bpm in seated position. Patient does not complain of any symptoms of chest pain but does raise is arm toward his chest. He denies symptoms of chest pain. Patient static standing balance is impaired and requires min A x 2 in order to put on back brace. Nursing is notified of patient's increase in heart rate with activity and left in seated position. Patient was educated to perform seated exercises in order to improve strength due immobility. Progression toward goals:  [x]    Improving appropriately and progressing toward goals  []    Improving slowly and progressing toward goals  []    Not making progress toward goals and plan of care will be adjusted     PLAN:  Patient continues to benefit from skilled intervention to address the above impairments. Continue treatment per established plan of care. Discharge Recommendations:  Home Health  Further Equipment Recommendations for Discharge:  rolling walker     SUBJECTIVE:   Patient stated I am not very hungry.     OBJECTIVE DATA SUMMARY:       Critical Behavior:  Neurologic State: Alert, Appropriate for age  Orientation Level: Oriented X4  Cognition: Appropriate for age attention/concentration  Safety/Judgement: Awareness of environment, Insight into deficits  Functional Mobility Training:  Bed Mobility:     Supine to Sit: Minimum assistance;Assist x1      Patient demonstrates proper sequence for log roll without cues. Transfers:  Sit to Stand: Minimum assistance;Assist x1  Stand to Sit: Minimum assistance;Assist x1                             Balance:     Ambulation/Gait Training:  Distance (ft): 4 Feet (ft)  Assistive Device: Walker, rolling;Gait belt  Ambulation - Level of Assistance: Contact guard assistance        Gait Abnormalities: Step to gait     Patient requires contact guard assist with ambulation and increased time for mobility due to pain and for safety reasons since there are lines and leads. Patient has a step to patter. Hear rate must be monitored with minimal activity. Pain:  Pain Scale 1: Numeric (0 - 10)  Pain Intensity 1: 2  Pain Location 1: Back  Pain Orientation 1: Medial  Pain Description 1: Aching  Pain Intervention(s) 1: Declines  Activity Tolerance:   Good- no complications with upright activity, heart rate does increase from 100 to 136 from supine to standing position. Please refer to the flowsheet for vital signs taken during this treatment. After treatment:   [x]    Patient left in no apparent distress sitting up in chair  []    Patient left in no apparent distress in bed  [x]    Call bell left within reach  [x]    Nursing notified  [x]    Caregiver present  []    Chair alarm activated    COMMUNICATION/COLLABORATION:   The patients plan of care was discussed with: Registered Nurse    Sanna Marie   Time Calculation: 24 mins    Regarding student involvement in patient care:  A student participated in this treatment session. Per CMS Medicare statements and APTA guidelines I certify that the following was true:  1. I was present and directly observed the entire session. 2. I made all skilled judgments and clinical decisions regarding care. 3. I am the practitioner responsible for assessment, treatment, and documentation.

## 2018-03-24 LAB
ANION GAP SERPL CALC-SCNC: 8 MMOL/L (ref 5–15)
APTT PPP: 38.5 SEC (ref 22.1–32)
BUN SERPL-MCNC: 9 MG/DL (ref 6–20)
BUN/CREAT SERPL: 12 (ref 12–20)
CALCIUM SERPL-MCNC: 9.8 MG/DL (ref 8.5–10.1)
CHLORIDE SERPL-SCNC: 103 MMOL/L (ref 97–108)
CO2 SERPL-SCNC: 29 MMOL/L (ref 21–32)
CREAT SERPL-MCNC: 0.78 MG/DL (ref 0.7–1.3)
GLUCOSE BLD STRIP.AUTO-MCNC: 142 MG/DL (ref 65–100)
GLUCOSE BLD STRIP.AUTO-MCNC: 196 MG/DL (ref 65–100)
GLUCOSE BLD STRIP.AUTO-MCNC: 196 MG/DL (ref 65–100)
GLUCOSE BLD STRIP.AUTO-MCNC: 273 MG/DL (ref 65–100)
GLUCOSE SERPL-MCNC: 172 MG/DL (ref 65–100)
HGB BLD-MCNC: 9.9 G/DL (ref 12.1–17)
POTASSIUM SERPL-SCNC: 3.8 MMOL/L (ref 3.5–5.1)
SERVICE CMNT-IMP: ABNORMAL
SODIUM SERPL-SCNC: 140 MMOL/L (ref 136–145)
THERAPEUTIC RANGE,PTTT: ABNORMAL SECS (ref 58–77)

## 2018-03-24 PROCEDURE — 80048 BASIC METABOLIC PNL TOTAL CA: CPT | Performed by: ORTHOPAEDIC SURGERY

## 2018-03-24 PROCEDURE — 85018 HEMOGLOBIN: CPT | Performed by: ORTHOPAEDIC SURGERY

## 2018-03-24 PROCEDURE — 36415 COLL VENOUS BLD VENIPUNCTURE: CPT | Performed by: ORTHOPAEDIC SURGERY

## 2018-03-24 PROCEDURE — 74011250637 HC RX REV CODE- 250/637: Performed by: ORTHOPAEDIC SURGERY

## 2018-03-24 PROCEDURE — 82962 GLUCOSE BLOOD TEST: CPT

## 2018-03-24 PROCEDURE — 74011250637 HC RX REV CODE- 250/637: Performed by: INTERNAL MEDICINE

## 2018-03-24 PROCEDURE — 97116 GAIT TRAINING THERAPY: CPT

## 2018-03-24 PROCEDURE — 65660000000 HC RM CCU STEPDOWN

## 2018-03-24 PROCEDURE — 74011636637 HC RX REV CODE- 636/637: Performed by: INTERNAL MEDICINE

## 2018-03-24 PROCEDURE — 77030027138 HC INCENT SPIROMETER -A

## 2018-03-24 PROCEDURE — 85730 THROMBOPLASTIN TIME PARTIAL: CPT | Performed by: INTERNAL MEDICINE

## 2018-03-24 RX ADMIN — GABAPENTIN 300 MG: 300 CAPSULE ORAL at 17:47

## 2018-03-24 RX ADMIN — OXYCODONE HYDROCHLORIDE 5 MG: 5 TABLET ORAL at 08:22

## 2018-03-24 RX ADMIN — ACETAMINOPHEN 650 MG: 325 TABLET ORAL at 17:47

## 2018-03-24 RX ADMIN — Medication 10 ML: at 22:19

## 2018-03-24 RX ADMIN — RIVAROXABAN 15 MG: 10 TABLET, FILM COATED ORAL at 17:47

## 2018-03-24 RX ADMIN — Medication 10 ML: at 14:17

## 2018-03-24 RX ADMIN — POLYETHYLENE GLYCOL 3350 17 G: 17 POWDER, FOR SOLUTION ORAL at 08:13

## 2018-03-24 RX ADMIN — ATORVASTATIN CALCIUM 20 MG: 20 TABLET, FILM COATED ORAL at 22:19

## 2018-03-24 RX ADMIN — OXYCODONE HYDROCHLORIDE 10 MG: 5 TABLET ORAL at 17:47

## 2018-03-24 RX ADMIN — STANDARDIZED SENNA CONCENTRATE AND DOCUSATE SODIUM 1 TABLET: 8.6; 5 TABLET, FILM COATED ORAL at 17:48

## 2018-03-24 RX ADMIN — FAMOTIDINE 20 MG: 20 TABLET, FILM COATED ORAL at 08:14

## 2018-03-24 RX ADMIN — FAMOTIDINE 20 MG: 20 TABLET, FILM COATED ORAL at 17:48

## 2018-03-24 RX ADMIN — ALLOPURINOL 200 MG: 100 TABLET ORAL at 22:19

## 2018-03-24 RX ADMIN — STANDARDIZED SENNA CONCENTRATE AND DOCUSATE SODIUM 1 TABLET: 8.6; 5 TABLET, FILM COATED ORAL at 08:14

## 2018-03-24 RX ADMIN — RIVAROXABAN 15 MG: 10 TABLET, FILM COATED ORAL at 10:42

## 2018-03-24 RX ADMIN — GABAPENTIN 300 MG: 300 CAPSULE ORAL at 08:13

## 2018-03-24 RX ADMIN — Medication 10 ML: at 05:31

## 2018-03-24 RX ADMIN — INSULIN LISPRO 5 UNITS: 100 INJECTION, SOLUTION INTRAVENOUS; SUBCUTANEOUS at 08:14

## 2018-03-24 RX ADMIN — OXYCODONE HYDROCHLORIDE 5 MG: 5 TABLET ORAL at 22:22

## 2018-03-24 RX ADMIN — INSULIN LISPRO 2 UNITS: 100 INJECTION, SOLUTION INTRAVENOUS; SUBCUTANEOUS at 12:25

## 2018-03-24 RX ADMIN — INSULIN LISPRO 2 UNITS: 100 INJECTION, SOLUTION INTRAVENOUS; SUBCUTANEOUS at 18:10

## 2018-03-24 NOTE — PROGRESS NOTES
ORTHOPAEDIC LUMBAR FUSION PROGRESS NOTE    NAME:     Ella Nelson   :       1955   MRN:       [id number]   DATE:      3/24/2018    POD:    4 Days Post-Op  S/P:    Procedure(s):  L4-S1 LAMINECTOMY, FUSION, INSTRUMENTATION, TLIF, BONE GRAFT    SUBJECTIVE:    C/O back pain along surgical incision  No leg pain or numbness  Denies nausea/vomiting, chest pain, headache or shortness of breath  Pain controlled  Has been transitioned to [address] [place] [address] 1330      18   0452   18   0056   HGB  9.9*   < >  12.2   HCT   --    --   36.8   NA  140   < >  139   K  3.8   < >  4.1   CL  103   < >  102   CO2  29   < >  31   BUN  9   < >  10   CREA  0.78   < >  0.94   GLU  172*   < >  172*    < > = values in this interval not displayed. Patient Vitals for the past 12 hrs:   BP Temp Pulse Resp SpO2 Weight   18 0800 118/72 98.9 °F (37.2 °C) 94 18 97 % -   18 0701 - - 88 - - -   18 0700 102/60 - 88 (!) 0 94 % -   18 0600 113/74 - - - - -   18 0530 - - - - - 97.8 kg (215 lb 9.8 oz)   18 0500 113/80 - (!) 120 29 97 % -   18 0400 114/76 - 93 20 97 % -   18 0300 126/81 99.2 °F (37.3 °C) 96 9 97 % -   18 0200 109/74 - 94 21 97 % -   18 0100 118/74 - 94 19 96 % -   18 0000 117/72 - 100 22 96 % -       EXAM:  Dressings clean, dry and intact   Positive strength/ROM bilat lower ext.   Neuro intact to sensation  Calves, soft & nontender  BL LEs NVID      PLAN:  Continue PO pain medications  Continue PO Xarelto  PT/OT, OOB w/ assist  Tolerating diet  Transfer from ICU per Medical Team when pt is deemed stable enough  D/C home w/ home health per rec's when pt is cleared by PT      Rony Patel, [address]  Orthopaedic Surgery  Physician Assistant to Dr. Ramona Kaye

## 2018-03-24 NOTE — PROGRESS NOTES
Problem: Mobility Impaired (Adult and Pediatric)  Goal: *Acute Goals and Plan of Care (Insert Text)  Physical Therapy Goals  Initiated 3/21/2018    1. Patient will move from supine to sit and sit to supine  in bed with independence within 4 days. 2. Patient will perform sit to stand with modified independence within 4 days. 3. Patient will ambulate with modified independence for 100 feet with the least restrictive device within 4 days. 4. Patient will ascend/descend 4 stairs with both handrail(s) with modified independence within 4 days. 5. Patient will verbalize and demonstrate understanding of spinal precautions (No bending, lifting greater than 5 lbs, or twisting; log-roll technique; frequent repositioning as instructed) within 4 days. physical Therapy TREATMENT  Patient: Genoveva Olson (15 y.o. male)  Date: 3/24/2018  Diagnosis: Lumbar adjacent segment disease with spondylolisthesis [M51.36, M43.16]  Lumbar stenosis with neurogenic claudication [M48.062] Lumbar stenosis with neurogenic claudication  Procedure(s) (LRB):  L4-S1 LAMINECTOMY, FUSION, INSTRUMENTATION, TLIF, BONE GRAFT (N/A) 4 Days Post-Op  Precautions: Back, Fall  Chart, physical therapy assessment, plan of care and goals were reviewed. ASSESSMENT:  Pt presents standing at bedside with RN, agreeable to treatment with low pain reports, brace in place. Pt completed ambulation of 200 feet with RW, slow, steady pace, narrow BROWN with reciprocal stepping pattern. Pt expressing fatigue from transfer from ICU and requesting to position supine post treatment.  bpm following ambulation. Progression toward goals:  [x]    Improving appropriately and progressing toward goals  []    Improving slowly and progressing toward goals  []    Not making progress toward goals and plan of care will be adjusted     PLAN:  Patient continues to benefit from skilled intervention to address the above impairments.   Continue treatment per established plan of care.  Discharge Recommendations:  Home Health  Further Equipment Recommendations for Discharge:  rolling walker     SUBJECTIVE:   Patient stated It's been a busy day.     OBJECTIVE DATA SUMMARY:   Critical Behavior:  Neurologic State: Alert, Appropriate for age  Orientation Level: Oriented X4  Cognition: Follows commands  Safety/Judgement: Awareness of environment, Fall prevention, Insight into deficits  Functional Mobility Training:  Bed Mobility:        Sit to Supine: Stand-by assistance           Transfers:  Sit to Stand: Stand-by assistance  Stand to Sit: Stand-by assistance                             Balance:  Sitting: Intact; With support  Standing: Intact; With support  Standing - Static: Good  Standing - Dynamic : Fair  Ambulation/Gait Training:  Distance (ft): 200 Feet (ft)  Assistive Device: Brace/Splint; Walker, rolling;Gait belt  Ambulation - Level of Assistance: Contact guard assistance                                              Pain:  Pain Scale 1: Numeric (0 - 10)  Pain Intensity 1: 5  Pain Location 1: Back  Pain Orientation 1: Medial  Pain Description 1: Aching  Pain Intervention(s) 1: Medication (see MAR)  Activity Tolerance:   Good.   After treatment:   []    Patient left in no apparent distress sitting up in chair  [x]    Patient left in no apparent distress in bed  [x]    Call bell left within reach  [x]    Nursing notified  []    Caregiver present  [x]    Bed alarm activated    COMMUNICATION/COLLABORATION:   The patients plan of care was discussed with: Registered Nurse    Anjum Laureano PTA   Time Calculation: 12 mins

## 2018-03-24 NOTE — PROGRESS NOTES
1900 received report from off going nurse, Eileen Cervantes RN. Plan of care reviewed. Family at bedside. Patient assessed. See flowsheet. 2300 patient reassessed. No new changes noted. 0300 patient reassessed. No new changes noted. 0700 Bedside and Verbal shift change report given to Eileen Cervantes RN (oncoming nurse) by [employer] RN (offgoing nurse). Report included the following information SBAR, Kardex, Intake/Output, MAR, Recent Results and Cardiac Rhythm NSR-ST. Shift Summary:  Patient had a fairly uneventful shift. Noted to be ST 130s upon moving and toileting. Denies any pain/discomfort noted. hemovac drain with little output noted. Dressing to lumbar incision c/d/i. Slept well this shift.

## 2018-03-24 NOTE — PROGRESS NOTES
Problem: Falls - Risk of  Goal: *Absence of Falls  Document David Fall Risk and appropriate interventions in the flowsheet.    Outcome: Progressing Towards Goal  Fall Risk Interventions:  Mobility Interventions: Bed/chair exit alarm         Medication Interventions: Bed/chair exit alarm, Evaluate medications/consider consulting pharmacy, Patient to call before getting OOB    Elimination Interventions: Bed/chair exit alarm, Call light in reach, Toileting schedule/hourly rounds    History of Falls Interventions: Bed/chair exit alarm, Evaluate medications/consider consulting pharmacy, Door open when patient unattended, Investigate reason for fall, Room close to nurse's station

## 2018-03-24 NOTE — PROGRESS NOTES
Bedside shift change report given to Tracie Cali RN (oncoming nurse) by Lilian Granados RN (offgoing nurse). Report included the following information SBAR, Kardex, Intake/Output and MAR.

## 2018-03-24 NOTE — PROGRESS NOTES
[facility]     Name: Dillon Albarran MRN: [id number]   : 1955 Hospital: [address]   Date: 3/24/2018        Impression Plan   1. Saddle PE  2. Hypoxia  3. Tachycardia  4. S/p laminectomy               · NOAC started yesterday evening . No signs of bleeding  · Mobility per ortho. Full mobility ok from Pulmonary standpoint  · Factor V leiden pending  · If patient doesn not have factor V leiden deficiency, would opt for 3-6 months treatment. · Would get repeat LE doppler of right leg if tingling/numbness not improving in the next 24 hrs. · Famotidine  · Regular diet  · Transfer to remote tele  · Will see again on Monday           Radiology  ( personally reviewed) CTA chest reviewed: large saddle PE, no infiltrates   ABG No results for input(s): PHI, PO2I, PCO2I in the last 72 hours. Subjective     Overnight events:  Has some backpain, overall getting better. Still having numbness and tingling in right leg that has not gotten better (no worse either)    Past Medical History:   Diagnosis Date    Gout     Hyperlipidemia       Past Surgical History:   Procedure Laterality Date    HX COLONOSCOPY N/A     HX KNEE ARTHROSCOPY Bilateral ,    torn meniscus    HX ORTHOPAEDIC Right 2011    remove bone spur on elbow      Prior to Admission medications    Medication Sig Start Date End Date Taking? Authorizing Provider   oxyCODONE-acetaminophen (PERCOCET) 5-325 mg per tablet Take 1 Tab by mouth every four (4) hours as needed for Pain. Yes Historical Provider   cyclobenzaprine (FLEXERIL) 10 mg tablet Take 1 Tab by mouth three (3) times daily as needed for Muscle Spasm(s). 3/20/18  Yes ETIENNE Mancini   docusate sodium (COLACE) 100 mg capsule Take 1 Cap by mouth two (2) times a day. 3/20/18  Yes ETIENNE Mancini   promethazine (PHENERGAN) 25 mg tablet Take 1 Tab by mouth every six (6) hours as needed for Nausea.  3/20/18  Yes ETIENNE Merritt   oxyCODONE IR (ROXICODONE) 5 mg immediate release tablet Take 1-2 tablets PO every 4 to 6 hours prn post-operative pain 3/20/18  Yes ETIENNE Cardoso   atorvastatin (LIPITOR) 20 mg tablet Take 20 mg by mouth nightly. Yes Historical Provider   allopurinol (ZYLOPRIM) 100 mg tablet Take 200 mg by mouth nightly. Yes Historical Provider   gabapentin (NEURONTIN) 300 mg capsule Take 300 mg by mouth two (2) times a day. Yes Historical Provider     Current Facility-Administered Medications   Medication Dose Route Frequency    rivaroxaban (XARELTO) tablet 15 mg  15 mg Oral BID WITH MEALS    insulin lispro (HUMALOG) injection   SubCUTAneous AC&HS    gabapentin (NEURONTIN) capsule 300 mg  300 mg Oral BID    atorvastatin (LIPITOR) tablet 20 mg  20 mg Oral QHS    allopurinol (ZYLOPRIM) tablet 200 mg  200 mg Oral QHS    sodium chloride (NS) flush 5-10 mL  5-10 mL IntraVENous Q8H    senna-docusate (PERICOLACE) 8.6-50 mg per tablet 1 Tab  1 Tab Oral BID    polyethylene glycol (MIRALAX) packet 17 g  17 g Oral DAILY    famotidine (PEPCID) tablet 20 mg  20 mg Oral BID     No Known Allergies   Social History   Substance Use Topics    Smoking status: Never Smoker    Smokeless tobacco: Never Used    Alcohol use 1.2 oz/week     2 Cans of beer per week      Comment: does not drink every week      Family History   Problem Relation Age of Onset    Cancer Mother      lung    Heart Failure Father     Heart Attack Father      x2    Hypertension Father     Diabetes Sister      Factor 11 leiden    Other Sister      diverticulitis          Laboratory: I have personally reviewed the critical care flowsheet and labs.      Recent Labs      03/24/18 0452 03/23/18 0236  03/22/18   0056  03/21/18   1852   WBC   --    --   9.3  8.7   HGB  9.9*  12.8  12.2  10.7*   HCT   --    --   36.8  32.3*   PLT   --    --   165  141*     Recent Labs      03/24/18 0452 03/23/18 0236  03/22/18   0056   NA  140  139  139   K  3.8  3.2*  4.1   CL 103  101  102   CO2  29  29  31   GLU  172*  173*  172*   BUN  9  9  10   CREA  0.78  0.99  0.94   CA  9.8  10.2*  9.6   MG   --    --   1.8   PHOS   --    --   2.9   ALB   --    --   2.7*   SGOT   --    --   20   ALT   --    --   25       Objective:     Mode Rate Tidal Volume Pressure FiO2 PEEP                    Vital Signs:     TMAX(24)      Intake/Output:   Last shift:         Last 3 shifts: 03/24 0701 - 03/24 1900  In: -   Out: 350 [Urine:350]RRIOLAST3    Intake/Output Summary (Last 24 hours) at 03/24/18 1210  Last data filed at 03/24/18 1105   Gross per 24 hour   Intake          1387.35 ml   Output             2075 ml   Net          -687.65 ml     EXAM:   GENERAL: well developed and in no distress, on RA HEENT:  PERRL, EOMI, no alar flaring or epistaxis, oral mucosa moist without cyanosis, NECK:  no jugular vein distention, no retractions, no thyromegaly or masses, LUNGS: CTA, no w/r/r HEART:  Regular rate and rhythm with no MGR; no edema is present, ABDOMEN:  soft with no tenderness, bowel sounds present, EXTREMITIES:  warm with no cyanosis, SKIN:  no jaundice or ecchymosis and NEUROLOGIC:  alert and oriented, grossly non-focal    Walter Banuelos MD  [facility]

## 2018-03-24 NOTE — PROGRESS NOTES
BEDSIDE REPORT - ASSUME CARE    0700: Bedside shift change report received by Mikaela Mena RN. Report given with SBAR, Kardex, Intake/Output and Recent Results. Opportunity for questions and clarification was provided. Assumed care of patient. Safety instructions given and acknowledged by pt. Provided incentive spirometry with instruction. [id number] - OUT REPORT:    Verbal report given to Fide Llanos RN(name) on Henry Fermin  being transferred to [facility](unit) for routine progression of care       Report consisted of patients Situation, Background, Assessment and   Recommendations(SBAR). Information from the following report(s) SBAR, Kardex, Intake/Output, Recent Results and Cardiac Rhythm NSR; ST was reviewed with the receiving nurse. Lines:   Peripheral IV 03/20/18 Left Forearm (Active)   Site Assessment Clean, dry, & intact 3/24/2018  3:00 AM   Phlebitis Assessment 0 3/24/2018  3:00 AM   Infiltration Assessment 0 3/24/2018  3:00 AM   Dressing Status Clean, dry, & intact 3/24/2018  3:00 AM   Dressing Type Transparent 3/24/2018  3:00 AM   Hub Color/Line Status Pink;Capped 3/24/2018  3:00 AM   Action Taken Open ports on tubing capped;Dressing changed 3/23/2018  5:00 PM   Alcohol Cap Used Yes 3/24/2018  3:00 AM       Peripheral IV 03/22/18 Right Arm (Active)   Site Assessment Clean, dry, & intact 3/24/2018  3:00 AM   Phlebitis Assessment 0 3/24/2018  3:00 AM   Infiltration Assessment 0 3/24/2018  3:00 AM   Dressing Status Clean, dry, & intact 3/24/2018  3:00 AM   Dressing Type Transparent 3/24/2018  3:00 AM   Hub Color/Line Status Pink;Capped 3/24/2018  3:00 AM   Action Taken Open ports on tubing capped 3/23/2018  5:00 PM   Alcohol Cap Used Yes 3/24/2018  3:00 AM        Opportunity for questions and clarification was provided. Patient transported with:   Monitor  Tech        Valuables transferred with patient are personal items. Medications sent with patient are none.

## 2018-03-25 VITALS
OXYGEN SATURATION: 94 % | RESPIRATION RATE: 18 BRPM | DIASTOLIC BLOOD PRESSURE: 61 MMHG | SYSTOLIC BLOOD PRESSURE: 97 MMHG | BODY MASS INDEX: 28.45 KG/M2 | WEIGHT: 215.61 LBS | HEART RATE: 78 BPM | TEMPERATURE: 98.5 F

## 2018-03-25 LAB
ANION GAP SERPL CALC-SCNC: 7 MMOL/L (ref 5–15)
BUN SERPL-MCNC: 14 MG/DL (ref 6–20)
BUN/CREAT SERPL: 15 (ref 12–20)
CALCIUM SERPL-MCNC: 10.1 MG/DL (ref 8.5–10.1)
CHLORIDE SERPL-SCNC: 104 MMOL/L (ref 97–108)
CO2 SERPL-SCNC: 29 MMOL/L (ref 21–32)
CREAT SERPL-MCNC: 0.91 MG/DL (ref 0.7–1.3)
GLUCOSE BLD STRIP.AUTO-MCNC: 135 MG/DL (ref 65–100)
GLUCOSE BLD STRIP.AUTO-MCNC: 230 MG/DL (ref 65–100)
GLUCOSE SERPL-MCNC: 150 MG/DL (ref 65–100)
HGB BLD-MCNC: 10 G/DL (ref 12.1–17)
POTASSIUM SERPL-SCNC: 4.1 MMOL/L (ref 3.5–5.1)
SERVICE CMNT-IMP: ABNORMAL
SERVICE CMNT-IMP: ABNORMAL
SODIUM SERPL-SCNC: 140 MMOL/L (ref 136–145)

## 2018-03-25 PROCEDURE — 36415 COLL VENOUS BLD VENIPUNCTURE: CPT | Performed by: PHYSICIAN ASSISTANT

## 2018-03-25 PROCEDURE — 80048 BASIC METABOLIC PNL TOTAL CA: CPT | Performed by: PHYSICIAN ASSISTANT

## 2018-03-25 PROCEDURE — 97116 GAIT TRAINING THERAPY: CPT

## 2018-03-25 PROCEDURE — 74011250637 HC RX REV CODE- 250/637: Performed by: ORTHOPAEDIC SURGERY

## 2018-03-25 PROCEDURE — 74011636637 HC RX REV CODE- 636/637: Performed by: INTERNAL MEDICINE

## 2018-03-25 PROCEDURE — 97535 SELF CARE MNGMENT TRAINING: CPT | Performed by: OCCUPATIONAL THERAPIST

## 2018-03-25 PROCEDURE — 82962 GLUCOSE BLOOD TEST: CPT

## 2018-03-25 PROCEDURE — 74011250637 HC RX REV CODE- 250/637: Performed by: INTERNAL MEDICINE

## 2018-03-25 PROCEDURE — 85018 HEMOGLOBIN: CPT | Performed by: PHYSICIAN ASSISTANT

## 2018-03-25 RX ADMIN — INSULIN LISPRO 3 UNITS: 100 INJECTION, SOLUTION INTRAVENOUS; SUBCUTANEOUS at 08:51

## 2018-03-25 RX ADMIN — OXYCODONE HYDROCHLORIDE 5 MG: 5 TABLET ORAL at 15:34

## 2018-03-25 RX ADMIN — POLYETHYLENE GLYCOL 3350 17 G: 17 POWDER, FOR SOLUTION ORAL at 08:52

## 2018-03-25 RX ADMIN — OXYCODONE HYDROCHLORIDE 5 MG: 5 TABLET ORAL at 08:12

## 2018-03-25 RX ADMIN — FAMOTIDINE 20 MG: 20 TABLET, FILM COATED ORAL at 08:51

## 2018-03-25 RX ADMIN — Medication 10 ML: at 05:12

## 2018-03-25 RX ADMIN — STANDARDIZED SENNA CONCENTRATE AND DOCUSATE SODIUM 1 TABLET: 8.6; 5 TABLET, FILM COATED ORAL at 08:50

## 2018-03-25 RX ADMIN — GABAPENTIN 300 MG: 300 CAPSULE ORAL at 08:51

## 2018-03-25 RX ADMIN — RIVAROXABAN 15 MG: 10 TABLET, FILM COATED ORAL at 08:48

## 2018-03-25 NOTE — PROGRESS NOTES
Problem: Self Care Deficits Care Plan (Adult)  Goal: *Acute Goals and Plan of Care (Insert Text)  Occupational Therapy Goals  Initiated 3/21/2018    1. Patient will perform LB dressing with modified independence using Reacher PRN within 7 days. 2.  Patient will perform toilet transfer with modified independence using most appropriate DME within 7 days. 3.  Patient will toileting at modified independence within 7 days. 4.  Patient will don/doff back brace at independence within 7 days. 5.  Patient will verbalize/demonstrate 3/3 back precautions during ADL tasks without cues within 7 days. Occupational Therapy TREATMENT  Patient: Kaylene Rothman (20 y.o. male)  Date: 3/25/2018  Diagnosis: Lumbar adjacent segment disease with spondylolisthesis [M51.36, M43.16]  Lumbar stenosis with neurogenic claudication [M48.062] Lumbar stenosis with neurogenic claudication  Procedure(s) (LRB):  L4-S1 LAMINECTOMY, FUSION, INSTRUMENTATION, TLIF, BONE GRAFT (N/A) 5 Days Post-Op  Precautions: Back, Fall No bending, no lifting greater than 5 lbs, no twisting, log-roll technique, repositioning every 20-30 min except when sleeping, LSO brace when OOB  Chart, occupational therapy assessment, plan of care, and goals were reviewed. ASSESSMENT:  Pt is making steady progress toward goals. He performed LE dressing with SBA, toileting and functional mobility in room and bathroom with SBA. He demonstrated good safety awareness and no LOB during session. Do not anticipate any OT needs after discharge. Progression toward goals:  []          Improving appropriately and progressing toward goals  [x]          Improving slowly and progressing toward goals  []          Not making progress toward goals and plan of care will be adjusted     PLAN:  Patient continues to benefit from skilled intervention to address the above impairments. Continue treatment per established plan of care.   Discharge Recommendations:  None for OT  Further Equipment Recommendations for Discharge:  TBD     SUBJECTIVE:   Patient stated I feel much better.   The patient stated 3/3 back precautions. Reviewed all 3 with patient. OBJECTIVE DATA SUMMARY:   Cognitive/Behavioral Status:  Neurologic State: Alert, Appropriate for age  Orientation Level: Oriented X4  Cognition: Appropriate for age attention/concentration, Appropriate decision making, Appropriate safety awareness, Follows commands  Safety/Judgement: Awareness of environment, Driving appropriateness, Fall prevention, Good awareness of safety precautions, Home safety, Insight into deficits    Functional Mobility and Transfers for ADLs:  Bed Mobility:  Rolling: Modified independent (log roll)  Supine to Sit: Modified independent  Sit to Supine: Modified independent    Transfers:  Sit to Stand: Stand-by assistance  Functional Transfers  Bathroom Mobility: Stand-by assistance  Toilet Transfer : Stand-by assistance  Cues: Verbal cues provided        Balance:  Sitting: Intact  Standing: Intact; With support    ADL Intervention and Instruction:  Grooming  Grooming Assistance: Stand-by assistance (standing at sink)  Washing Hands: Stand-by assistance  Cues: Verbal cues provided;Visual cues provided    Upper Body Dressing Assistance  Dressing Assistance: Supervision/set-up  Orthotics(Brace): Supervision/set-up  Pullover Shirt: Stand-by assistance (in standing)  Cues: Verbal cues provided;Visual cues provided    Lower Body Dressing Assistance  Dressing Assistance: Stand-by assistance  Underpants: Stand-by assistance  Socks: Supervision/set-up  Leg Crossed Method Used: Yes  Position Performed: Seated edge of bed  Cues: Don;Doff;Verbal cues provided;Visual cues provided    Toileting  Toileting Assistance: Stand-by assistance  Bladder Hygiene: Modified independent  Bowel Hygiene: Modified indpendent  Clothing Management: Stand-by assistance  Cues: Verbal cues provided  Adaptive Equipment: Grab bars    Cognitive Retraining  Safety/Judgement: Awareness of environment;Driving appropriateness; Fall prevention;Good awareness of safety precautions; Home safety; Insight into deficits     Bathing: Patient instructed and indicated understanding when bathing to not submerge wound in water, stand to shower or sponge bathe, cover wound with plastic and tape to ensure no water reaches bandage/wound without cues. Dressing brace: Patient instructed and demonstrated to don/doff velcro on brace using dominant side, keeping non-dominant side intact. Patient instructed and demonstrated in meantime of being able to stand with back against wall to don/doff brace, to don/doff seated using lap and bed/chair surface to support brace while manipulating. Dressing lower body: Patient instructed to don brace first and on the benefits to remain seated to don all clothing to increase independence with precautions and pain management. Toileting: Patient instructed on the benefits of using flushable wet wipes and toilet tongs if decreased reach or pain for lul care. Also, the benefits of a reacher to aid in clothing management. Home safety: Patient instructed and indicated understanding on home modifications and safety (raise height of ADL objects, appropriate height of chair surfaces, recliner safety, change of floor surfaces, clear pathways) to increase independence and fall prevention. Standing: Patient instructed and indicated understanding to walk up to sink/counter top/surfaces, step into walker, square off while using objects, slide objects along surfaces, to increase adherence to back precautions and fall prevention. Patient instructed to increase amount of time standing in order to increase independence and tolerance with ADLs. During prolonged standing, can open cabinet door or place foot on stool to decrease spinal pressure/increase pain.    Tub transfer: Patient instructed and indicated understanding regarding when it is safe to begin transfer into tub (complete stairs with PT, advance exercises with PT high enough to clear tub height, and while clothes donned practice with another person present). Patient instructed and indicated understanding to use the same technique as used with stairs when entering and exiting tub (\"up with good leg, down with bad leg\"). Patient instructed and indicated understanding the benefits of maintaining activity tolerance, functional mobility, and independence with self care tasks during acute stay  to ensure safe return home and to baseline. Encouraged patient to increase frequency and duration OOB, not sitting longer than 30 mins without marching/walking with staff, be out of bed for all meals, perform daily ADLs (as approved by RN/MD regarding bathing etc), and performing functional mobility to/from bathroom. Patient instruction and indicated understanding on body mechanics, ergonomics and gravitational force on the spine during different body positions to plan activities in prep for return home to complete instrumental ADLs and back to work safely. Pain:  Pain Scale 1: Numeric (0 - 10)  Pain Intensity 1: 2              Activity Tolerance:   Good  Please refer to the flowsheet for vital signs taken during this treatment.   After treatment:   [] Patient left in no apparent distress sitting up in chair  [x] Patient left in no apparent distress in bed  [x] Call bell left within reach  [x] Nursing notified  [] Caregiver present  [] Bed alarm activated    COMMUNICATION/COLLABORATION:   The patients plan of care was discussed with: Physical Therapist and Registered Nurse    Blanca Heath OT  Time Calculation: 25 mins

## 2018-03-25 NOTE — PROGRESS NOTES
Received call from [facility], [place] in reference to output from accordian drain. No output noted when assessment completed. Drained accordian and noted 5 ml output. Received orders to remove drain at 7 am if output less than 30 ml. No output noted in drain this morning. Accordian removed. Non adherent dressing placed with tegaderm. Orthopedic & Surgical Nursing Communication Tool      7:49 AM  3/25/2018     Bedside and Verbal shift change report given to Nora Shelton RN (incoming nurse) by Jayleen Rosado RN (outgoing nurse) on Patty Jones a 58 y.o. male who was admitted on 3/20/2018  1:49 PM. Report included the following information SBAR, Kardex, Intake/Output, MAR, Recent Results and Med Rec Status. Hourly rounds completed during shift. No acute distress noted. Opportunity for questions and clarifications were given to the incoming nurse. Patient's bed is in low position, side rails x2, door open PRN, call bell within reach and patient not in distress.     Jayleen Rosado RN

## 2018-03-25 NOTE — PROGRESS NOTES
Discharge instructions reviewed with the patient and spouse (who is a nurse) both expressed understanding. All questions were answered. Reviewed next due times of all medications. IV accesses removed X2, Copy of discharge instructions given to patient with all prescriptions. Patient escorted out via wheelchair by volunteer staff accompanied by family who will be driving patient home.

## 2018-03-25 NOTE — PROGRESS NOTES
Orthopaedic Progress Note  Post Op day: 5 Days Post-Op    March 25, 2018 12:51 PM     Patient: Gerard Abebe MRN: [id number]  SSN: [id number]    YOB: 1955  Age: 58 y.o. Sex: male      Admit date:  3/20/2018  Date of Surgery:  3/20/2018   Procedures:  Procedure(s):  L4-S1 LAMINECTOMY, FUSION, INSTRUMENTATION, TLIF, BONE GRAFT  Admitting Physician:  Chas Rodriguez MD   Surgeon:  Sisi Jackson) and Role:     * Chas Rodriguez MD - Primary    Consulting Physician(s): Treatment Team: Attending Provider: Chas Rodriguez MD; Utilization Review: Navya Harris RN; Consulting Provider: Stevie Jaimes MD; Care Manager: Chencho Conley; Consulting Provider: Judith Cutler MD    SUBJECTIVE:     Gerard Abebe is a 58 y.o. male is 5 Days Post-Op s/p Procedure(s):  L4-S1 LAMINECTOMY, FUSION, INSTRUMENTATION, TLIF, BONE GRAFT with an appropriate level of post-operative pain. Pt still has slight numbness in right foot. Radicular pain that he had prior to surgery in [facility] is resolved. Pt starting to complain of left lateral thigh pain, anterior to greater trochanter, does not radiate. No complaints of SOB. Pt + BM. Pt up ad amanda. Xarelto started today. OBJECTIVE:       Physical Exam:  General: Alert, cooperative, no distress. Respiratory: Respirations unlabored  Neurological:  Neurovascular exam within normal limits. Motor: + DF/PF. Musculoskeletal: Calves soft, supple, non-tender upon palpation. No pain to palpation to left greater trochanteric bursa. No pain with internal/ external rotation of hip. 5/5 strength bilateral LE. Dressing/Wound:  Clean, dry and intact. No significant erythema or swelling.       Vital Signs:      Patient Vitals for the past 8 hrs:   BP Temp Pulse Resp SpO2   03/25/18 1103 97/61 98.5 °F (36.9 °C) 78 18 94 %   03/25/18 0711 - - (!) 114 - -   03/25/18 0705 102/66 98.4 °F (36.9 °C) (!) 104 19 95 %                                          Temp (24hrs), Av.3 °F (36.8 °C), Min:97.7 °F (36.5 °C), Max:98.5 °F (36.9 °C)      Labs:        Recent Labs      18   0509   HGB  10.0*     Lab Results   Component Value Date/Time    Sodium 140 2018 05:09 AM    Potassium 4.1 2018 05:09 AM    Chloride 104 2018 05:09 AM    CO2 29 2018 05:09 AM    Glucose 150 (H) 2018 05:09 AM    BUN 14 2018 05:09 AM    Creatinine 0.91 2018 05:09 AM    Calcium 10.1 2018 05:09 AM       PT/OT:        Gait  Gait Abnormalities: Step to gait  Ambulation - Level of Assistance: Contact guard assistance  Distance (ft): 200 Feet (ft)  Assistive Device: Brace/Splint, Walker, rolling, Gait belt       Patient mobility  Bed Mobility Training  Rolling: Minimum assistance, Additional time, Assist x1 (log roll)  Supine to Sit: Minimum assistance, Assist x1  Sit to Supine: Stand-by assistance  Scooting: Contact guard assistance, Additional time, Assist x1  Transfer Training  Sit to Stand: Stand-by assistance  Stand to Sit: Stand-by assistance  Bed to Chair: Minimum assistance, Additional time, Assist x1 (using RW)      Gait Training  Assistive Device: Brace/Splint, Walker, rolling, Gait belt  Ambulation - Level of Assistance: Contact guard assistance  Distance (ft): 200 Feet (ft)            ASSESSMENT / PLAN:   Principal Problem:    Lumbar stenosis with neurogenic claudication (3/20/2018)    Active Problems:    Saddle embolus of pulmonary artery (HCC) (3/21/2018)      Hyperlipidemia ()      Gout ()      Anemia (3/21/2018)                    Pain Control: Adequate with oral agents   DVT Prophylaxis: Xarelto BID, SCDs, GORDON Hose    Therapy/ Weight bearing: Up with PT/ cleared for discharge from their perspective. Wound/ incisional issues: CDI   Medical concerns: Saddle PE's; Xarelto started 3/23; discussed with pulmonary, cleared for discharge. Hospitalist signed off on patient. Disposition: Home health set up thru: [facility] in [place], [place].  Pt is cleared for discharge.       Signed By:  Fuad Kwong NP    [facility]

## 2018-03-25 NOTE — PROGRESS NOTES
Problem: Mobility Impaired (Adult and Pediatric)  Goal: *Acute Goals and Plan of Care (Insert Text)  Physical Therapy Goals  Initiated 3/21/2018    1. Patient will move from supine to sit and sit to supine  in bed with independence within 4 days. 2. Patient will perform sit to stand with modified independence within 4 days. 3. Patient will ambulate with modified independence for 100 feet with the least restrictive device within 4 days. 4. Patient will ascend/descend 4 stairs with both handrail(s) with modified independence within 4 days. 5. Patient will verbalize and demonstrate understanding of spinal precautions (No bending, lifting greater than 5 lbs, or twisting; log-roll technique; frequent repositioning as instructed) within 4 days. physical Therapy TREATMENT  Patient: Rancho Harvey (78 y.o. male)  Date: 3/25/2018  Diagnosis: Lumbar adjacent segment disease with spondylolisthesis [M51.36, M43.16]  Lumbar stenosis with neurogenic claudication [M48.062] Lumbar stenosis with neurogenic claudication  Procedure(s) (LRB):  L4-S1 LAMINECTOMY, FUSION, INSTRUMENTATION, TLIF, BONE GRAFT (N/A) 5 Days Post-Op  Precautions: Back, Fall  Chart, physical therapy assessment, plan of care and goals were reviewed. ASSESSMENT:  Patient supine in bed and agrees to ambulate and try stairs with PT in anticipation of discharge today. Patient rolling R, R sidelying to sit mod I; donned brace Indep; sit to stand supervision; ambulates 225' with RW supervision; able to ascend/descend 4 steps with b/l Rail CGA. Patient returned to room and requested to sit up to chair. PT educated patient on activity modification upon initial return to home with gradual progression of activity. Importance of positional changes every hour. Patient indicates understanding. Patient demonstrates controlled and safe functional mobility and is cleared by PT.     Patient is cleared for discharge from PT standpoint:  YES [x]     NO []     Pre activity HR:  102bpm  Post activity HR 112bpm  Progression toward goals:  [x]      Improving appropriately and progressing toward goals  []      Improving slowly and progressing toward goals  []      Not making progress toward goals and plan of care will be adjusted     PLAN:  Patient continues to benefit from skilled intervention to address the above impairments. Continue treatment per established plan of care. Discharge Recommendations:  Home Health  Further Equipment Recommendations for Discharge:  Has RW, brace     SUBJECTIVE:   Patient stated I think I am going home this afternoon.    The patient stated 3/3 back precautions. Reviewed all 3 with patient. OBJECTIVE DATA SUMMARY:   Critical Behavior:  Neurologic State: Alert, Appropriate for age  Orientation Level: Oriented X4  Cognition: Appropriate for age attention/concentration, Appropriate decision making, Appropriate safety awareness, Follows commands  Safety/Judgement: Awareness of environment, Driving appropriateness, Fall prevention, Good awareness of safety precautions, Home safety, Insight into deficits  Functional Mobility Training:  Bed Mobility:  Log Rolling: Modified independent  Supine to Sit: Modified independent  Sit to Supine: Modified independent           Brace donned with  independence   Transfers:  Sit to Stand: Supervision  Stand to Sit: Supervision                             Balance:  Sitting: Intact  Standing: Intact; With support  Ambulation/Gait Training:  Distance (ft): 225 Feet (ft)  Assistive Device: Gait belt  Ambulation - Level of Assistance: Supervision        Gait Abnormalities: Altered arm swing                                     Stairs:  Number of Stairs Trained: 4  Stairs - Level of Assistance: Contact guard assistance  Rail Use: Both    Pain:  Pain Scale 1: Numeric (0 - 10)  Pain Intensity 1: 2              Activity Tolerance:   See above  Please refer to the flowsheet for vital signs taken during this treatment.   After treatment:   [x]  Patient left in no apparent distress sitting up in chair  []  Patient left in no apparent distress in bed  [x]  Call bell left within reach  [x]  Nursing notified  []  Caregiver present  []  Bed alarm activated    COMMUNICATION/COLLABORATION:   The patients plan of care was discussed with: Registered Nurse    Jagdish Ontiveros DPT   Time Calculation: 21 mins

## 2018-03-26 NOTE — DISCHARGE SUMMARY
DISCHARGE SUMMARY     Patient: Aura Sahw                             Medical Record Number: [id number]                : 1955  Age: 58 y.o. Admit Date: 3/20/2018  Discharge Date: 3/25/2018  Admission Diagnosis: Lumbar adjacent segment disease with spondylolisthesis [M51.36, M43.16]  Lumbar stenosis with neurogenic claudication [M48.062]  Discharge Diagnosis: Lumbar adjacent segment disease with spondylolisthesis [M51.36, M43.16]  Lumbar stenosis with neurogenic claudication [M48.062]  Procedures: Procedure(s):  L4-S1 LAMINECTOMY, FUSION, INSTRUMENTATION, TLIF, BONE GRAFT  Surgeon: Hilary Parson MD  Co-surgeon:   Assistants:   Anesthesia: General  Complications: None     History of Present Illness:  Aura Shaw is a 58 y.o. male with a history of intractable low back and radiculopathy. Despite conservative management and after clinical and radiographic evaluation, it was determined that he suffered from lumbar stenosis  and lumbar spondylolisthesis and would benefit from Procedure(s):  L4-S1 LAMINECTOMY, FUSION, INSTRUMENTATION, TLIF, BONE GRAFT, which he consented to undergo after a discussion of the risks, benefits, alternatives, rehab concerns, and potential complications of surgery. Hospital Course:  Aura Shaw tolerated the procedure well. He was transferred  to the recovery room in stable condition. After a brief stay, the patient was then transferred to the Orthopedic floor. On postoperative day #1, the dressing was clean and dry and he was neurovascularly intact. The patient was afebrile and vital signs were stable. Calves were soft and non-tender bilaterally. On post-operative day #1, patient developed tachycardia with mobilization/activity. A Chest CTA was ordered. It was positive for pulmonary emboli and a saddle embolism. The hospitalist was consulted. Mr Dayron Haley was transferred to the ICU and started on IV heparin. On 3/23/18, he was transitioned to an oral anti-coagulant.  He improved with treatment and was subsequently transferred back to the 4th floor on remote telemetry on 3/24/18. Patricia Luque made excellent progress with physical therapy and was discharged to Home w/ Home Health in stable condition on postoperative day 5. He was provided with routine postoperative instructions and advised to follow up in my office in 2 weeks following discharge from the hospital.  He was given prescriptions for medication to control post-operative symptoms. He will follow up with his primary care physician and pulmonology as advised    Discharge Medications:  Discharge Medication List as of 3/25/2018  3:03 PM      START taking these medications    Details   rivaroxaban (XARELTO) 15 mg tab tablet Take 1 Tab by mouth two (2) times daily (with meals). , Print, Disp-60 Tab, R-2      cyclobenzaprine (FLEXERIL) 10 mg tablet Take 1 Tab by mouth three (3) times daily as needed for Muscle Spasm(s). , Print, Disp-60 Tab, R-2      docusate sodium (COLACE) 100 mg capsule Take 1 Cap by mouth two (2) times a day., Print, Disp-60 Cap, R-2      promethazine (PHENERGAN) 25 mg tablet Take 1 Tab by mouth every six (6) hours as needed for Nausea. , Print, Disp-60 Tab, R-2      oxyCODONE IR (ROXICODONE) 5 mg immediate release tablet Take 1-2 tablets PO every 4 to 6 hours prn post-operative pain, Print, Disp-90 Tab, R-0         CONTINUE these medications which have NOT CHANGED    Details   atorvastatin (LIPITOR) 20 mg tablet Take 20 mg by mouth nightly., Historical Med      allopurinol (ZYLOPRIM) 100 mg tablet Take 200 mg by mouth nightly., Historical Med      gabapentin (NEURONTIN) 300 mg capsule Take 300 mg by mouth two (2) times a day., Historical Med         STOP taking these medications       oxyCODONE-acetaminophen (PERCOCET) 5-325 mg per tablet Comments:   Reason for Stopping:               ETIENNE Lazcano  3/25/2018  Orthopaedic Surgery  Physician Assistant to Dr. Shila Charles

## 2018-03-27 LAB
COMMENT, 511217: NORMAL
F5 GENE MUT ANL BLD/T: NORMAL

## (undated) DEVICE — STERILE POLYISOPRENE POWDER-FREE SURGICAL GLOVES: Brand: PROTEXIS

## (undated) DEVICE — MEDI-VAC NON-CONDUCTIVE SUCTION TUBING: Brand: CARDINAL HEALTH

## (undated) DEVICE — DRSG PATCH ANTIMIC 1INX4.0MM -- CONVERT TO ITEM 356053

## (undated) DEVICE — ELECTRODE BLDE L4IN NONINSULATED EDGE

## (undated) DEVICE — SYR 10ML LUER LOK 1/5ML GRAD --

## (undated) DEVICE — 1010 S-DRAPE TOWEL DRAPE 10/BX: Brand: STERI-DRAPE™

## (undated) DEVICE — SUTURE MCRYL SZ 3-0 L27IN ABSRB UD L24MM PS-1 3/8 CIR PRIM Y936H

## (undated) DEVICE — STERILE POLYISOPRENE POWDER-FREE SURGICAL GLOVES WITH EMOLLIENT COATING: Brand: PROTEXIS

## (undated) DEVICE — LIGHT HANDLE: Brand: DEVON

## (undated) DEVICE — COVER,TABLE,60X90,STERILE: Brand: MEDLINE

## (undated) DEVICE — Device

## (undated) DEVICE — DRAPE,REIN 53X77,STERILE: Brand: MEDLINE

## (undated) DEVICE — SUTURE ABSORBABLE BRAIDED 2-0 CT-1 27 IN UD VICRYL J259H

## (undated) DEVICE — SUTURE STRATAFIX SPRL SZ 1 L14IN ABSRB VLT L48CM CTX 1/2 SXPD2B405

## (undated) DEVICE — MAGNETIC DRAPE: Brand: DEVON

## (undated) DEVICE — (D)PREP SKN CHLRAPRP APPL 26ML -- CONVERT TO ITEM 371833

## (undated) DEVICE — TIP CLEANER: Brand: VALLEYLAB

## (undated) DEVICE — KENDALL SCD EXPRESS SLEEVES, KNEE LENGTH, MEDIUM: Brand: KENDALL SCD

## (undated) DEVICE — WATERPROOF, BACTERIA PROOF DRESSING WITH ABSORBENT SEE THROUGH PAD: Brand: OPSITE POST-OP VISIBLE 25X10CM CTN 20

## (undated) DEVICE — TRAY CATH OD16FR SIL URIN M STATLOK STBL DEV SURSTP

## (undated) DEVICE — CONTAINER,SPECIMEN,3OZ,OR STRL: Brand: MEDLINE

## (undated) DEVICE — SYRINGE MED 20ML STD CLR PLAS LUERLOCK TIP N CTRL DISP

## (undated) DEVICE — DRAPE,UTILITY,TAPE,15X26,STERILE: Brand: MEDLINE

## (undated) DEVICE — SUTURE VCRL SZ 0 L36IN ABSRB UD L36MM CT-1 1/2 CIR J946H

## (undated) DEVICE — ASTOUND STANDARD SURGICAL GOWN, XL: Brand: CONVERTORS

## (undated) DEVICE — DRAPE XR C ARM 41X74IN LF --

## (undated) DEVICE — ACCY PA100-A LEGEND LUB/DIFFUSER 4 PACK: Brand: MIDAS REX®

## (undated) DEVICE — DRAIN KT WND 10FR RND 400ML --

## (undated) DEVICE — DEVON™ KNEE AND BODY STRAP 60" X 3" (1.5 M X 7.6 CM): Brand: DEVON

## (undated) DEVICE — NDL SPNE QNCKE 18GX3.5IN LF --

## (undated) DEVICE — THE MILL DISPOSABLE - MEDIUM

## (undated) DEVICE — LAMINECTOMY RICHMOND-LF: Brand: MEDLINE INDUSTRIES, INC.

## (undated) DEVICE — SYR LR LCK 1ML GRAD NSAF 30ML --

## (undated) DEVICE — SYSTEM SKIN CLSR 22CM DERMBND PRINEO

## (undated) DEVICE — AMD ANTIMICROBIAL DRAIN SPONGES, 6 PLY, 0.2% POLYHEXAMETHYLENE BIGUANIDE HCI (PHMB): Brand: EXCILON

## (undated) DEVICE — STOPCOCK IV 3W --

## (undated) DEVICE — CODMAN® SURGICAL PATTIES 3/4" X 3/4" (1.91CM X 1.91CM): Brand: CODMAN®

## (undated) DEVICE — 3M™ TEGADERM™ TRANSPARENT FILM DRESSING FRAME STYLE, 1626, 4 IN X 4-3/4 IN (10 CM X 12 CM), 50/CT 4CT/CASE: Brand: 3M™ TEGADERM™

## (undated) DEVICE — NEEDLE HYPO 22GA L1.5IN BLK S STL HUB POLYPR SHLD REG BVL

## (undated) DEVICE — SUTURE VCRL SZ 1 L36IN ABSRB UD L36MM CT-1 1/2 CIR J947H

## (undated) DEVICE — 3000CC GUARDIAN II: Brand: GUARDIAN

## (undated) DEVICE — CATHETER IV 14GA L1.25IN TEF STR HUB INTROCAN SFTY

## (undated) DEVICE — SUTURE VCRL SZ 0 L27IN ABSRB UD L26MM CT-2 1/2 CIR J270H

## (undated) DEVICE — KIT POS W/ FOAM ARM CRADL SHEARGUARD CHST PD CVR FOR SPNL

## (undated) DEVICE — BONE WAX WHITE: Brand: BONE WAX WHITE

## (undated) DEVICE — TOOL 14MH30 LEGEND 14CM 3MM: Brand: MIDAS REX ™

## (undated) DEVICE — INFECTION CONTROL KIT SYS

## (undated) DEVICE — 3M™ TEGADERM™ TRANSPARENT FILM DRESSING FRAME STYLE, 1624W, 2-3/8 IN X 2-3/4 IN (6 CM X 7 CM), 100/CT 4CT/CASE: Brand: 3M™ TEGADERM™

## (undated) DEVICE — BIPOLAR FORCEPS CORD,BANANA LEADS: Brand: VALLEYLAB

## (undated) DEVICE — SOLUTION IRRIG 1000ML H2O STRL BLT

## (undated) DEVICE — INTENDED FOR TISSUE SEPARATION, AND OTHER PROCEDURES THAT REQUIRE A SHARP SURGICAL BLADE TO PUNCTURE OR CUT.: Brand: BARD-PARKER ® CARBON RIB-BACK BLADES